# Patient Record
Sex: MALE | Race: WHITE | Employment: OTHER
[De-identification: names, ages, dates, MRNs, and addresses within clinical notes are randomized per-mention and may not be internally consistent; named-entity substitution may affect disease eponyms.]

---

## 2017-01-18 ENCOUNTER — RECORDS - HEALTHEAST (OUTPATIENT)
Dept: ADMINISTRATIVE | Facility: OTHER | Age: 42
End: 2017-01-18

## 2017-06-12 ENCOUNTER — COMMUNICATION - HEALTHEAST (OUTPATIENT)
Dept: SCHEDULING | Facility: CLINIC | Age: 42
End: 2017-06-12

## 2017-08-28 ENCOUNTER — OFFICE VISIT - HEALTHEAST (OUTPATIENT)
Dept: FAMILY MEDICINE | Facility: CLINIC | Age: 42
End: 2017-08-28

## 2017-08-28 DIAGNOSIS — E78.00 HYPERCHOLESTEROLEMIA: ICD-10-CM

## 2017-08-28 DIAGNOSIS — Z00.00 ROUTINE GENERAL MEDICAL EXAMINATION AT A HEALTH CARE FACILITY: ICD-10-CM

## 2017-08-28 DIAGNOSIS — Z12.5 SCREENING FOR PROSTATE CANCER: ICD-10-CM

## 2017-08-28 LAB — PSA SERPL-MCNC: 0.3 NG/ML (ref 0–2.5)

## 2017-08-28 ASSESSMENT — MIFFLIN-ST. JEOR: SCORE: 1767.46

## 2017-09-11 ENCOUNTER — COMMUNICATION - HEALTHEAST (OUTPATIENT)
Dept: FAMILY MEDICINE | Facility: CLINIC | Age: 42
End: 2017-09-11

## 2017-09-25 ENCOUNTER — OFFICE VISIT - HEALTHEAST (OUTPATIENT)
Dept: FAMILY MEDICINE | Facility: CLINIC | Age: 42
End: 2017-09-25

## 2017-09-25 DIAGNOSIS — E78.00 HYPERCHOLESTEREMIA: ICD-10-CM

## 2017-11-02 ENCOUNTER — AMBULATORY - HEALTHEAST (OUTPATIENT)
Dept: NURSING | Facility: CLINIC | Age: 42
End: 2017-11-02

## 2017-11-02 DIAGNOSIS — Z23 INFLUENZA VACCINE NEEDED: ICD-10-CM

## 2018-08-20 ENCOUNTER — COMMUNICATION - HEALTHEAST (OUTPATIENT)
Dept: FAMILY MEDICINE | Facility: CLINIC | Age: 43
End: 2018-08-20

## 2018-08-20 DIAGNOSIS — E78.00 HYPERCHOLESTEREMIA: ICD-10-CM

## 2018-08-20 DIAGNOSIS — Z12.5 SCREENING FOR PROSTATE CANCER: ICD-10-CM

## 2018-08-23 ENCOUNTER — AMBULATORY - HEALTHEAST (OUTPATIENT)
Dept: LAB | Facility: CLINIC | Age: 43
End: 2018-08-23

## 2018-08-23 DIAGNOSIS — Z12.5 SCREENING FOR PROSTATE CANCER: ICD-10-CM

## 2018-08-23 DIAGNOSIS — E78.00 HYPERCHOLESTEREMIA: ICD-10-CM

## 2018-08-23 LAB
ALBUMIN SERPL-MCNC: 4.2 G/DL (ref 3.5–5)
ALP SERPL-CCNC: 63 U/L (ref 45–120)
ALT SERPL W P-5'-P-CCNC: 13 U/L (ref 0–45)
ANION GAP SERPL CALCULATED.3IONS-SCNC: 11 MMOL/L (ref 5–18)
AST SERPL W P-5'-P-CCNC: 17 U/L (ref 0–40)
BILIRUB SERPL-MCNC: 0.7 MG/DL (ref 0–1)
BUN SERPL-MCNC: 14 MG/DL (ref 8–22)
CALCIUM SERPL-MCNC: 9.8 MG/DL (ref 8.5–10.5)
CHLORIDE BLD-SCNC: 105 MMOL/L (ref 98–107)
CHOLEST SERPL-MCNC: 235 MG/DL
CO2 SERPL-SCNC: 25 MMOL/L (ref 22–31)
CREAT SERPL-MCNC: 0.85 MG/DL (ref 0.7–1.3)
CRP SERPL HS-MCNC: 1.2 MG/L (ref 0–3)
FASTING STATUS PATIENT QL REPORTED: YES
GFR SERPL CREATININE-BSD FRML MDRD: >60 ML/MIN/1.73M2
GLUCOSE BLD-MCNC: 92 MG/DL (ref 70–125)
HDLC SERPL-MCNC: 58 MG/DL
LDLC SERPL CALC-MCNC: 160 MG/DL
POTASSIUM BLD-SCNC: 4.4 MMOL/L (ref 3.5–5)
PROT SERPL-MCNC: 7.7 G/DL (ref 6–8)
PSA SERPL-MCNC: 0.3 NG/ML (ref 0–2.5)
SODIUM SERPL-SCNC: 141 MMOL/L (ref 136–145)
TRIGL SERPL-MCNC: 84 MG/DL

## 2018-08-27 ENCOUNTER — OFFICE VISIT - HEALTHEAST (OUTPATIENT)
Dept: FAMILY MEDICINE | Facility: CLINIC | Age: 43
End: 2018-08-27

## 2018-08-27 DIAGNOSIS — E78.00 HYPERCHOLESTEREMIA: ICD-10-CM

## 2018-08-27 DIAGNOSIS — Z00.00 ROUTINE GENERAL MEDICAL EXAMINATION AT A HEALTH CARE FACILITY: ICD-10-CM

## 2018-08-27 ASSESSMENT — MIFFLIN-ST. JEOR: SCORE: 1727.21

## 2018-10-26 ENCOUNTER — AMBULATORY - HEALTHEAST (OUTPATIENT)
Dept: NURSING | Facility: CLINIC | Age: 43
End: 2018-10-26

## 2018-10-26 DIAGNOSIS — Z23 FLU VACCINE NEED: ICD-10-CM

## 2018-12-06 ENCOUNTER — COMMUNICATION - HEALTHEAST (OUTPATIENT)
Dept: FAMILY MEDICINE | Facility: CLINIC | Age: 43
End: 2018-12-06

## 2019-04-24 ENCOUNTER — AMBULATORY - HEALTHEAST (OUTPATIENT)
Dept: LAB | Facility: CLINIC | Age: 44
End: 2019-04-24

## 2019-04-24 DIAGNOSIS — E78.00 HYPERCHOLESTEREMIA: ICD-10-CM

## 2019-04-24 LAB
CHOLEST SERPL-MCNC: 261 MG/DL
FASTING STATUS PATIENT QL REPORTED: YES
HDLC SERPL-MCNC: 62 MG/DL
LDLC SERPL CALC-MCNC: 179 MG/DL
TRIGL SERPL-MCNC: 101 MG/DL

## 2019-04-25 ENCOUNTER — COMMUNICATION - HEALTHEAST (OUTPATIENT)
Dept: FAMILY MEDICINE | Facility: CLINIC | Age: 44
End: 2019-04-25

## 2019-04-29 ENCOUNTER — OFFICE VISIT - HEALTHEAST (OUTPATIENT)
Dept: FAMILY MEDICINE | Facility: CLINIC | Age: 44
End: 2019-04-29

## 2019-04-29 DIAGNOSIS — R06.83 SNORING: ICD-10-CM

## 2019-04-29 DIAGNOSIS — E78.00 HYPERCHOLESTEREMIA: ICD-10-CM

## 2019-04-29 DIAGNOSIS — Z13.228 ENCOUNTER FOR SCREENING FOR OTHER METABOLIC DISORDERS: ICD-10-CM

## 2019-04-29 ASSESSMENT — MIFFLIN-ST. JEOR: SCORE: 1734.02

## 2019-05-06 ENCOUNTER — COMMUNICATION - HEALTHEAST (OUTPATIENT)
Dept: FAMILY MEDICINE | Facility: CLINIC | Age: 44
End: 2019-05-06

## 2019-05-06 ENCOUNTER — HOSPITAL ENCOUNTER (OUTPATIENT)
Dept: CT IMAGING | Facility: CLINIC | Age: 44
Discharge: HOME | End: 2019-05-06
Attending: FAMILY MEDICINE

## 2019-05-06 DIAGNOSIS — E78.00 HYPERCHOLESTEREMIA: ICD-10-CM

## 2019-05-06 LAB
CV CALCIUM SCORE AGATSTON LM: 0
CV CALCIUM SCORING AGATSON LAD: 0
CV CALCIUM SCORING AGATSTON CX: 0
CV CALCIUM SCORING AGATSTON RCA: 0
CV CALCIUM SCORING AGATSTON TOTAL: 0

## 2019-06-13 ENCOUNTER — COMMUNICATION - HEALTHEAST (OUTPATIENT)
Dept: FAMILY MEDICINE | Facility: CLINIC | Age: 44
End: 2019-06-13

## 2019-07-10 ENCOUNTER — RECORDS - HEALTHEAST (OUTPATIENT)
Dept: ADMINISTRATIVE | Facility: OTHER | Age: 44
End: 2019-07-10

## 2019-08-27 ENCOUNTER — AMBULATORY - HEALTHEAST (OUTPATIENT)
Dept: LAB | Facility: CLINIC | Age: 44
End: 2019-08-27

## 2019-08-27 DIAGNOSIS — Z13.228 ENCOUNTER FOR SCREENING FOR OTHER METABOLIC DISORDERS: ICD-10-CM

## 2019-08-27 DIAGNOSIS — E78.00 HYPERCHOLESTEREMIA: ICD-10-CM

## 2019-08-27 LAB
CHOLEST SERPL-MCNC: 245 MG/DL
FASTING STATUS PATIENT QL REPORTED: YES
FASTING STATUS PATIENT QL REPORTED: YES
GLUCOSE BLD-MCNC: 80 MG/DL (ref 70–99)
HDLC SERPL-MCNC: 55 MG/DL
LDLC SERPL CALC-MCNC: 165 MG/DL
TRIGL SERPL-MCNC: 125 MG/DL

## 2019-09-02 ENCOUNTER — COMMUNICATION - HEALTHEAST (OUTPATIENT)
Dept: FAMILY MEDICINE | Facility: CLINIC | Age: 44
End: 2019-09-02

## 2019-10-07 ENCOUNTER — OFFICE VISIT - HEALTHEAST (OUTPATIENT)
Dept: SLEEP MEDICINE | Facility: CLINIC | Age: 44
End: 2019-10-07

## 2019-10-07 DIAGNOSIS — R29.818 SUSPECTED SLEEP APNEA: ICD-10-CM

## 2019-10-07 DIAGNOSIS — G47.10 HYPERSOMNIA: ICD-10-CM

## 2019-10-07 DIAGNOSIS — G47.8 NON-RESTORATIVE SLEEP: ICD-10-CM

## 2019-10-07 DIAGNOSIS — R06.83 SNORING: ICD-10-CM

## 2019-10-07 ASSESSMENT — MIFFLIN-ST. JEOR: SCORE: 1745.36

## 2019-10-21 ENCOUNTER — OFFICE VISIT - HEALTHEAST (OUTPATIENT)
Dept: INTERNAL MEDICINE | Facility: CLINIC | Age: 44
End: 2019-10-21

## 2019-10-21 DIAGNOSIS — J06.9 VIRAL URI WITH COUGH: ICD-10-CM

## 2019-10-21 ASSESSMENT — MIFFLIN-ST. JEOR: SCORE: 1740.82

## 2019-10-31 ENCOUNTER — OFFICE VISIT - HEALTHEAST (OUTPATIENT)
Dept: INTERNAL MEDICINE | Facility: CLINIC | Age: 44
End: 2019-10-31

## 2019-10-31 DIAGNOSIS — R05.9 COUGH: ICD-10-CM

## 2019-10-31 ASSESSMENT — MIFFLIN-ST. JEOR: SCORE: 1731.75

## 2019-11-11 ENCOUNTER — COMMUNICATION - HEALTHEAST (OUTPATIENT)
Dept: SLEEP MEDICINE | Facility: CLINIC | Age: 44
End: 2019-11-11

## 2019-11-11 DIAGNOSIS — R06.83 SNORING: ICD-10-CM

## 2019-11-11 DIAGNOSIS — G47.10 HYPERSOMNIA: ICD-10-CM

## 2019-11-11 DIAGNOSIS — G47.8 NON-RESTORATIVE SLEEP: ICD-10-CM

## 2019-11-11 DIAGNOSIS — R29.818 SUSPECTED SLEEP APNEA: ICD-10-CM

## 2019-11-13 ENCOUNTER — RECORDS - HEALTHEAST (OUTPATIENT)
Dept: SLEEP MEDICINE | Facility: CLINIC | Age: 44
End: 2019-11-13

## 2019-11-13 DIAGNOSIS — R29.818 OTHER SYMPTOMS AND SIGNS INVOLVING THE NERVOUS SYSTEM: ICD-10-CM

## 2019-11-13 DIAGNOSIS — R06.83 SNORING: ICD-10-CM

## 2019-11-13 DIAGNOSIS — G47.8 OTHER SLEEP DISORDERS: ICD-10-CM

## 2019-11-13 DIAGNOSIS — G47.10 HYPERSOMNIA, UNSPECIFIED: ICD-10-CM

## 2019-11-18 ENCOUNTER — COMMUNICATION - HEALTHEAST (OUTPATIENT)
Dept: SLEEP MEDICINE | Facility: CLINIC | Age: 44
End: 2019-11-18

## 2019-12-04 ENCOUNTER — TRANSFERRED RECORDS (OUTPATIENT)
Dept: HEALTH INFORMATION MANAGEMENT | Facility: CLINIC | Age: 44
End: 2019-12-04

## 2019-12-04 ENCOUNTER — OFFICE VISIT - HEALTHEAST (OUTPATIENT)
Dept: SLEEP MEDICINE | Facility: CLINIC | Age: 44
End: 2019-12-04

## 2019-12-04 DIAGNOSIS — G47.8 NON-RESTORATIVE SLEEP: ICD-10-CM

## 2019-12-04 DIAGNOSIS — R06.83 SNORING: ICD-10-CM

## 2019-12-04 DIAGNOSIS — R29.818 SUSPECTED SLEEP APNEA: ICD-10-CM

## 2019-12-04 DIAGNOSIS — G47.10 HYPERSOMNIA: ICD-10-CM

## 2019-12-04 ASSESSMENT — MIFFLIN-ST. JEOR: SCORE: 1731.75

## 2020-01-09 DIAGNOSIS — G47.10 HYPERSOMNIA: ICD-10-CM

## 2020-01-09 DIAGNOSIS — R29.818 SUSPECTED SLEEP APNEA: Primary | ICD-10-CM

## 2020-01-09 DIAGNOSIS — R06.83 SNORING: ICD-10-CM

## 2020-01-09 DIAGNOSIS — G47.8 NON-RESTORATIVE SLEEP: ICD-10-CM

## 2020-02-12 ENCOUNTER — THERAPY VISIT (OUTPATIENT)
Dept: SLEEP MEDICINE | Facility: CLINIC | Age: 45
End: 2020-02-12
Payer: COMMERCIAL

## 2020-02-12 DIAGNOSIS — G47.10 HYPERSOMNIA: ICD-10-CM

## 2020-02-12 DIAGNOSIS — R06.83 SNORING: ICD-10-CM

## 2020-02-12 DIAGNOSIS — R29.818 SUSPECTED SLEEP APNEA: ICD-10-CM

## 2020-02-12 DIAGNOSIS — G47.8 NON-RESTORATIVE SLEEP: ICD-10-CM

## 2020-02-12 PROCEDURE — 95810 POLYSOM 6/> YRS 4/> PARAM: CPT | Performed by: INTERNAL MEDICINE

## 2020-02-19 ENCOUNTER — AMBULATORY - HEALTHEAST (OUTPATIENT)
Dept: SLEEP MEDICINE | Facility: CLINIC | Age: 45
End: 2020-02-19

## 2020-02-19 LAB — SLPCOMP: NORMAL

## 2020-03-01 ENCOUNTER — HEALTH MAINTENANCE LETTER (OUTPATIENT)
Age: 45
End: 2020-03-01

## 2020-03-04 ENCOUNTER — OFFICE VISIT - HEALTHEAST (OUTPATIENT)
Dept: SLEEP MEDICINE | Facility: CLINIC | Age: 45
End: 2020-03-04

## 2020-03-04 DIAGNOSIS — G47.69 SLEEP-RELATED MOVEMENT DISORDER: ICD-10-CM

## 2020-03-04 DIAGNOSIS — G47.8 UPPER AIRWAY RESISTANCE SYNDROME: ICD-10-CM

## 2020-03-04 DIAGNOSIS — G47.10 HYPERSOMNIA: ICD-10-CM

## 2020-03-23 ENCOUNTER — COMMUNICATION - HEALTHEAST (OUTPATIENT)
Dept: FAMILY MEDICINE | Facility: CLINIC | Age: 45
End: 2020-03-23

## 2020-08-03 ENCOUNTER — RECORDS - HEALTHEAST (OUTPATIENT)
Dept: ADMINISTRATIVE | Facility: OTHER | Age: 45
End: 2020-08-03

## 2020-08-13 ENCOUNTER — COMMUNICATION - HEALTHEAST (OUTPATIENT)
Dept: FAMILY MEDICINE | Facility: CLINIC | Age: 45
End: 2020-08-13

## 2020-08-13 DIAGNOSIS — E78.00 HYPERCHOLESTEREMIA: ICD-10-CM

## 2020-08-24 ENCOUNTER — AMBULATORY - HEALTHEAST (OUTPATIENT)
Dept: LAB | Facility: CLINIC | Age: 45
End: 2020-08-24

## 2020-08-24 DIAGNOSIS — E78.00 HYPERCHOLESTEREMIA: ICD-10-CM

## 2020-08-24 LAB
ALBUMIN SERPL-MCNC: 4.2 G/DL (ref 3.5–5)
ALP SERPL-CCNC: 61 U/L (ref 45–120)
ALT SERPL W P-5'-P-CCNC: 19 U/L (ref 0–45)
ANION GAP SERPL CALCULATED.3IONS-SCNC: 9 MMOL/L (ref 5–18)
AST SERPL W P-5'-P-CCNC: 18 U/L (ref 0–40)
BILIRUB SERPL-MCNC: 0.5 MG/DL (ref 0–1)
BUN SERPL-MCNC: 13 MG/DL (ref 8–22)
CALCIUM SERPL-MCNC: 9.9 MG/DL (ref 8.5–10.5)
CHLORIDE BLD-SCNC: 102 MMOL/L (ref 98–107)
CHOLEST SERPL-MCNC: 252 MG/DL
CO2 SERPL-SCNC: 27 MMOL/L (ref 22–31)
CREAT SERPL-MCNC: 0.99 MG/DL (ref 0.7–1.3)
FASTING STATUS PATIENT QL REPORTED: YES
GFR SERPL CREATININE-BSD FRML MDRD: >60 ML/MIN/1.73M2
GLUCOSE BLD-MCNC: 85 MG/DL (ref 70–125)
HDLC SERPL-MCNC: 51 MG/DL
LDLC SERPL CALC-MCNC: 174 MG/DL
POTASSIUM BLD-SCNC: 4.4 MMOL/L (ref 3.5–5)
PROT SERPL-MCNC: 7.9 G/DL (ref 6–8)
SODIUM SERPL-SCNC: 138 MMOL/L (ref 136–145)
TRIGL SERPL-MCNC: 135 MG/DL

## 2020-08-25 ENCOUNTER — COMMUNICATION - HEALTHEAST (OUTPATIENT)
Dept: FAMILY MEDICINE | Facility: CLINIC | Age: 45
End: 2020-08-25

## 2020-08-28 ENCOUNTER — OFFICE VISIT - HEALTHEAST (OUTPATIENT)
Dept: FAMILY MEDICINE | Facility: CLINIC | Age: 45
End: 2020-08-28

## 2020-08-28 DIAGNOSIS — Z00.00 ROUTINE GENERAL MEDICAL EXAMINATION AT A HEALTH CARE FACILITY: ICD-10-CM

## 2020-08-28 DIAGNOSIS — E78.00 HYPERCHOLESTEREMIA: ICD-10-CM

## 2020-08-28 DIAGNOSIS — Z12.5 SCREENING FOR PROSTATE CANCER: ICD-10-CM

## 2020-08-28 LAB — PSA SERPL-MCNC: 0.2 NG/ML (ref 0–2.5)

## 2020-08-28 ASSESSMENT — MIFFLIN-ST. JEOR: SCORE: 1743.23

## 2020-09-29 ENCOUNTER — AMBULATORY - HEALTHEAST (OUTPATIENT)
Dept: NURSING | Facility: CLINIC | Age: 45
End: 2020-09-29

## 2020-12-09 ENCOUNTER — COMMUNICATION - HEALTHEAST (OUTPATIENT)
Dept: FAMILY MEDICINE | Facility: CLINIC | Age: 45
End: 2020-12-09

## 2020-12-09 DIAGNOSIS — E78.00 HYPERCHOLESTEREMIA: ICD-10-CM

## 2020-12-14 ENCOUNTER — HEALTH MAINTENANCE LETTER (OUTPATIENT)
Age: 45
End: 2020-12-14

## 2020-12-15 ENCOUNTER — AMBULATORY - HEALTHEAST (OUTPATIENT)
Dept: LAB | Facility: CLINIC | Age: 45
End: 2020-12-15

## 2020-12-15 DIAGNOSIS — E78.00 HYPERCHOLESTEREMIA: ICD-10-CM

## 2020-12-15 LAB
CHOLEST SERPL-MCNC: 241 MG/DL
FASTING STATUS PATIENT QL REPORTED: YES
HDLC SERPL-MCNC: 60 MG/DL
LDLC SERPL CALC-MCNC: 160 MG/DL
TRIGL SERPL-MCNC: 103 MG/DL

## 2021-03-27 ENCOUNTER — AMBULATORY - HEALTHEAST (OUTPATIENT)
Dept: NURSING | Facility: CLINIC | Age: 46
End: 2021-03-27

## 2021-04-09 ENCOUNTER — COMMUNICATION - HEALTHEAST (OUTPATIENT)
Dept: FAMILY MEDICINE | Facility: CLINIC | Age: 46
End: 2021-04-09

## 2021-04-12 ENCOUNTER — OFFICE VISIT - HEALTHEAST (OUTPATIENT)
Dept: FAMILY MEDICINE | Facility: CLINIC | Age: 46
End: 2021-04-12

## 2021-04-12 DIAGNOSIS — M25.461 PAIN AND SWELLING OF RIGHT KNEE: ICD-10-CM

## 2021-04-12 DIAGNOSIS — M25.561 PAIN AND SWELLING OF RIGHT KNEE: ICD-10-CM

## 2021-04-12 DIAGNOSIS — M79.10 MYALGIA: ICD-10-CM

## 2021-04-12 LAB
ALBUMIN SERPL-MCNC: 4.2 G/DL (ref 3.5–5)
ALP SERPL-CCNC: 83 U/L (ref 45–120)
ALT SERPL W P-5'-P-CCNC: 12 U/L (ref 0–45)
ANION GAP SERPL CALCULATED.3IONS-SCNC: 14 MMOL/L (ref 5–18)
AST SERPL W P-5'-P-CCNC: 17 U/L (ref 0–40)
BILIRUB SERPL-MCNC: 0.5 MG/DL (ref 0–1)
BUN SERPL-MCNC: 16 MG/DL (ref 8–22)
CALCIUM SERPL-MCNC: 9.7 MG/DL (ref 8.5–10.5)
CHLORIDE BLD-SCNC: 102 MMOL/L (ref 98–107)
CO2 SERPL-SCNC: 25 MMOL/L (ref 22–31)
CREAT SERPL-MCNC: 0.97 MG/DL (ref 0.7–1.3)
CRP SERPL HS-MCNC: 9 MG/L (ref 0–3)
ERYTHROCYTE [DISTWIDTH] IN BLOOD BY AUTOMATED COUNT: 12.4 % (ref 11–14.5)
ERYTHROCYTE [SEDIMENTATION RATE] IN BLOOD BY WESTERGREN METHOD: 21 MM/HR (ref 0–15)
GFR SERPL CREATININE-BSD FRML MDRD: >60 ML/MIN/1.73M2
GLUCOSE BLD-MCNC: 83 MG/DL (ref 70–125)
HCT VFR BLD AUTO: 44.3 % (ref 40–54)
HGB BLD-MCNC: 14.8 G/DL (ref 14–18)
MCH RBC QN AUTO: 28.8 PG (ref 27–34)
MCHC RBC AUTO-ENTMCNC: 33.4 G/DL (ref 32–36)
MCV RBC AUTO: 86 FL (ref 80–100)
PLATELET # BLD AUTO: 293 THOU/UL (ref 140–440)
PMV BLD AUTO: 8.8 FL (ref 7–10)
POTASSIUM BLD-SCNC: 4.5 MMOL/L (ref 3.5–5)
PROT SERPL-MCNC: 8.1 G/DL (ref 6–8)
RBC # BLD AUTO: 5.13 MILL/UL (ref 4.4–6.2)
RHEUMATOID FACT SERPL-ACNC: <15 IU/ML (ref 0–30)
SODIUM SERPL-SCNC: 141 MMOL/L (ref 136–145)
WBC: 8.6 THOU/UL (ref 4–11)

## 2021-04-12 ASSESSMENT — MIFFLIN-ST. JEOR: SCORE: 1707.28

## 2021-04-13 ENCOUNTER — COMMUNICATION - HEALTHEAST (OUTPATIENT)
Dept: FAMILY MEDICINE | Facility: CLINIC | Age: 46
End: 2021-04-13

## 2021-04-13 LAB — B BURGDOR IGG+IGM SER QL: 0.09 INDEX VALUE

## 2021-04-17 ENCOUNTER — AMBULATORY - HEALTHEAST (OUTPATIENT)
Dept: NURSING | Facility: CLINIC | Age: 46
End: 2021-04-17

## 2021-04-18 ENCOUNTER — HEALTH MAINTENANCE LETTER (OUTPATIENT)
Age: 46
End: 2021-04-18

## 2021-04-18 LAB
MAGNESIUM,RBC - HISTORICAL: 4.9 MG/DL (ref 3.5–7.1)
SPECIMEN STATUS: NORMAL

## 2021-04-22 ENCOUNTER — OFFICE VISIT - HEALTHEAST (OUTPATIENT)
Dept: FAMILY MEDICINE | Facility: CLINIC | Age: 46
End: 2021-04-22

## 2021-04-22 ENCOUNTER — COMMUNICATION - HEALTHEAST (OUTPATIENT)
Dept: FAMILY MEDICINE | Facility: CLINIC | Age: 46
End: 2021-04-22

## 2021-04-22 DIAGNOSIS — M25.561 PAIN AND SWELLING OF RIGHT KNEE: ICD-10-CM

## 2021-04-22 DIAGNOSIS — R79.82 ELEVATED C-REACTIVE PROTEIN (CRP): ICD-10-CM

## 2021-04-22 DIAGNOSIS — M25.461 PAIN AND SWELLING OF RIGHT KNEE: ICD-10-CM

## 2021-05-28 NOTE — PATIENT INSTRUCTIONS - HE
Consider [employer] - Dr. Renato Weinstein BPF - 2 capsules daily by [employer].  [facility]    Will patient schedule appointment? Yes  HE Radiology [facility]   Phone: [phone]    [facility]  PHONE: [phone]

## 2021-05-28 NOTE — PROGRESS NOTES
"SUBJECTIVE: Andre Garay is a 43 y.o. male with:  Chief Complaint   Patient presents with     Follow-up     lab result  lipid     Sleep Apnea    1) Elevated cholesterol - He is concerned about elevated LDL on recent lipid profile.  He eats fairly healthy but enjoys sweets.  Drinks wine most nights.  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 He has 2 siblings with elevated cholesterol.  He is worried about heart disease.  No FH. No chest pain.    2) LIZETH - He had a rolfing treatment and fell asleep during the treatment.  His provider felt he had sleep apnea based on his snoring.  His wife has also noticed his snoring.  He wakes up but does not feel refreshed.    SH: . Works as consultant.    OBJECTIVE: /84 (Patient Site: Left Arm, Patient Position: Sitting, Cuff Size: Adult Regular)   Pulse 72   Temp 97.5  F (36.4  C) (Oral)   Resp 16   Ht 5' 11\" (1.803 m)   Wt 184 lb 8 oz (83.7 kg)   BMI 25.73 kg/m   no distress    10 year ASCVD risk is 5.8%    Lab Results   Component Value Date    CHOL 261 (H) 04/24/2019    CHOL 235 (H) 08/23/2018    CHOL 256 (H) 08/25/2016     Lab Results   Component Value Date    HDL 62 04/24/2019    HDL 58 08/23/2018    HDL 50 08/25/2016     Lab Results   Component Value Date    LDLCALC 179 (H) 04/24/2019    LDLCALC 160 (H) 08/23/2018    LDLCALC 182 (H) 08/25/2016     Lab Results   Component Value Date    TRIG 101 04/24/2019    TRIG 84 08/23/2018    TRIG 120 08/25/2016     No components found for: CHOLHDL    Andre was seen today for follow-up and sleep apnea.    Diagnoses and all orders for this visit:    Hypercholesteremia- We discussed treatment options. He is going to cut back on sugar/ animal fats/ wine.  Recheck LDL in 3 months. He would like to proceed with a CT calcium score to further evaluate his risk of ASCVD.  -     CT Cardiac Calcium Score; Future  -     Lipid Cascade; Future    Patient Instructions   Consider " [employer] - Dr. Renato Weinstein BPF - 2 capsules daily by [employer].  [facility]    Will patient schedule appointment? Yes  HE Radiology [facility] [place]   Phone: [phone]    [facility]  PHONE: [phone]         Encounter for screening for other metabolic disorders  -     Glucose; Future    Snoring  -     Ambulatory referral to Sleep Medicine       Yanique Monge

## 2021-05-28 NOTE — TELEPHONE ENCOUNTER
Left message to call back for: Pt  Information to relay to patient:    PCP is okay to use reserve slots for next week. Please assist pt in making an appt.  Will also send mychart message.

## 2021-05-31 VITALS — BODY MASS INDEX: 26.74 KG/M2 | WEIGHT: 191 LBS | HEIGHT: 71 IN

## 2021-05-31 VITALS — BODY MASS INDEX: 26.31 KG/M2 | WEIGHT: 190 LBS

## 2021-06-02 VITALS — HEIGHT: 71 IN | BODY MASS INDEX: 25.62 KG/M2 | WEIGHT: 183 LBS

## 2021-06-02 NOTE — PROGRESS NOTES
"  Office Visit   Andre Garay   44 y.o. male    Date of Visit: 10/31/2019    Chief Complaint   Patient presents with     Cough        Assessment and Plan   1. Cough  And I do not see any evidence of pneumonia.  His oxygen level is good and his lung exam is clear.  He is actually improved from a week ago.  I recommend that he continue with conservative measures.  He is going to be traveling next week so I did tell him that if his symptoms seem to worsen over the weekend he could let me know and I would start him on a Z-Yonny.  He is in agreement with this plan.         No follow-ups on file.     History of Present Illness   This 44 y.o. old male comes in due to ongoing cough symptoms.  He has had a cough now for about 2 weeks but actually is improving.  He feels that he is still little bit tired but has not had fevers or shortness of breath.  His son was recently diagnosed with walking pneumonia and it was recommended that he get rechecked.  His cough is productive but mostly of clear phlegm.  Initially there was some green and yellow phlegm.  Again is not short of breath.  He has no history of lung disease.  Just feeling a little bit more tired.  He has no other acute concerns today.    Review of Systems: As above, systems otherwise reviewed and negative.     Medications, Allergies and Problem List   Patient Active Problem List   Diagnosis     Hypercholesteremia     Screening for prostate cancer     Current Outpatient Medications   Medication Sig Dispense Refill     ibuprofen (ADVIL) 200 MG tablet Take 200 mg by mouth every 6 (six) hours as needed for pain or headaches.       No current facility-administered medications for this visit.      No Known Allergies       Physical Exam     /82 (Patient Site: Right Arm, Patient Position: Sitting, Cuff Size: Adult Regular)   Pulse (!) 49   Temp 97.8  F (36.6  C) (Oral)   Ht 5' 11\" (1.803 m)   Wt 184 lb (83.5 kg)   SpO2 97%   BMI 25.66 kg/m      General:  " Patient is alert and in no apparent distress.  Cardiovascular:  Regular rate and rhythm, normal S1/S2, no murmurs, rubs, or gallop.  Pulmonary:  Lungs are clear to auscultation bilaterally with normal respiratory effort.  No wheezes or rales are detected.           Additional Information   Social History     Tobacco Use     Smoking status: Never Smoker     Smokeless tobacco: Never Used   Substance Use Topics     Alcohol use: Yes     Alcohol/week: 6.0 - 7.0 standard drinks     Types: 6 - 7 Glasses of wine per week     Drug use: No          Dee Dee Olivares MD

## 2021-06-02 NOTE — PROGRESS NOTES
Dear Dr. Monge, Yanique Trevino Md  [address] [place] [address]    Thank you for the opportunity to participate in the care of Mr. Andre Garay.    He is a 44 y.o. male who comes to the clinic with a chief complaint of excessive daytime sleepiness is been going on for more than 5 years.  The patient has been informed by medical professional that he has pauses in his breathing during sleep followed by snoring.  Patient also complains of nonrestorative sleep despite adequate hours.  The patient also has had episodes of drowsy driving.  Strongly advised patient against drowsy driving at all cost.  The patient's review of system is otherwise negative.     Ideal Sleep-Wake Cycle(devoid of societal pressure):    Patient would try to initiate sleep at around 11 PM with a sleep latency of 30 minutes. The patient would have 0 awakening. Final wake up time is around 7-8 AM.    Past Medical History  Past Medical History:   Diagnosis Date     Hypertension     RESOLVED        Past Surgical History  Past Surgical History:   Procedure Laterality Date     [place]       KNEE SURGERY  2002     SHOULDER SURGERY          Meds  Current Outpatient Medications   Medication Sig Dispense Refill     ibuprofen (ADVIL) 200 MG tablet Take 200 mg by mouth every 6 (six) hours as needed for pain or headaches.       No current facility-administered medications for this visit.         Allergies  Patient has no known allergies.     Social History  Social History     Socioeconomic History     Marital status:      Spouse name: Not on file     Number of children: Not on file     Years of education: Not on file     Highest education level: Not on file   Occupational History     Not on file   Social Needs     Financial resource strain: Not on file     Food insecurity:     Worry: Not on file     Inability: Not on file     Transportation needs:     Medical: Not on file     Non-medical: Not on file   Tobacco Use     Smoking status:  Never Smoker     Smokeless tobacco: Never Used   Substance and Sexual Activity     Alcohol use: Yes     Alcohol/week: 6.0 - 7.0 standard drinks     Types: 6 - 7 Glasses of wine per week     Drug use: No     Sexual activity: Not on file   Lifestyle     Physical activity:     Days per week: Not on file     Minutes per session: Not on file     Stress: Not on file   Relationships     Social connections:     Talks on phone: Not on file     Gets together: Not on file     Attends Judaism service: Not on file     Active member of club or organization: Not on file     Attends meetings of clubs or organizations: Not on file     Relationship status: Not on file     Intimate partner violence:     Fear of current or ex partner: Not on file     Emotionally abused: Not on file     Physically abused: Not on file     Forced sexual activity: Not on file   Other Topics Concern     Not on file   Social History Narrative     Not on file        Family History  Family History   Problem Relation Age of Onset     Hyperlipidemia Father      Other Father         enlarged prostate, NO cancer     Hyperlipidemia Brother      Alzheimer's disease Mother      Other Sister         Knee problems     Heart disease Neg Hx         Review of Systems:  Constitutional: Negative except as noted in HPI.   Eyes: Negative except as noted in HPI.   ENT: Negative except as noted in HPI.   Cardiovascular: Negative except as noted in HPI.   Respiratory: Negative except as noted in HPI.   Gastrointestinal: Negative except as noted in HPI.   Genitourinary: Negative except as noted in HPI.   Musculoskeletal: Negative except as noted in HPI.   Integumentary: Negative except as noted in HPI.   Neurological: Negative except as noted in HPI.   Psychiatric: Negative except as noted in HPI.   Endocrine: Negative except as noted in HPI.   Hematologic/Lymphatic: Negative except as noted in HPI.      STOP BANG 10/7/2019   Do you snore loudly (louder than talking or loud  "enough to be heard through closed doors)? 1   Do you often feel tired, fatigued, or sleepy during daytime? 1   Has anyone observed you stop breathing in your sleep? 1   Do you have or are you being treated for high blood pressure? 0   BMI more than 35 kg/m2 0   Age over 50 years old? 0   Neck circumference greater than 16 inches? 0   Gender male? 1   Total Score 4     Epworths Sleepiness Scale 10/7/2019   Sitting and reading 3   Watching TV 3   Sitting, inactive in a public place (e.g. a theatre or a meeting) 2   As a passenger in a car for an hour without a break 3   Lying down to rest in the afternoon when circumstances permit 3   Sitting and talking to someone 1   Sitting quietly after a lunch without alcohol 1   In a car, while stopped for a few minutes in traffic 1   Total score 17     Rooming 10/7/2019   Usual bedtime 10pm   Sleep Latency 10 minutes   Awakenings 0   Wake Up Time 6:30   Weekends varies   Energy Drinks no   Coffee yes   Cola no   Difficulty falling asleep No   Difficulty staying asleep No   Excessive daytime tiredness Yes   Excessive daytime sleepiness Yes   Dozing off while driving Yes   Shift Worker No   Sleep Walking? No   Restless legs symptoms Yes       Physical Exam:  /87   Pulse (!) 59   Ht 5' 11\" (1.803 m)   Wt 187 lb (84.8 kg)   SpO2 97%   BMI 26.08 kg/m    BMI:Body mass index is 26.08 kg/m .   GEN: NAD, appropriate for age  Head: Normocephalic.  EYES: PERRLA, EOMI  ENT: Oropharynx is clear, Chen class 4+ airway.   Nasal mucosa is moist without erythema  Neck : Thyroid is within normal limits. Neck Circ 15.75\"  CV: Regular rate and rhythm, S1 & S2 positive.  LUNGS: Bilateral breathsounds heard.   ABDOMEN: Positive bowel sounds in all quadrants, soft, no rebound or guarding  MUSCULOSKELETAL: No leg swelling  SKIN: warm, dry, no rashes  Neurological: Alert, oriented to time, place, and person.  Psych: normal mood, normal affect     Labs/Studies:     Lab Results   Component " Value Date    WBC 7.5 08/06/2015    HGB 14.5 08/06/2015    HCT 41.7 08/06/2015    MCV 88 08/06/2015     08/06/2015         Chemistry        Component Value Date/Time     08/23/2018 0919    K 4.4 08/23/2018 0919     08/23/2018 0919    CO2 25 08/23/2018 0919    BUN 14 08/23/2018 0919    CREATININE 0.85 08/23/2018 0919    GLU 80 08/27/2019 0814        Component Value Date/Time    CALCIUM 9.8 08/23/2018 0919    ALKPHOS 63 08/23/2018 0919    AST 17 08/23/2018 0919    ALT 13 08/23/2018 0919    BILITOT 0.7 08/23/2018 0919            No results found for: FERRITIN  No results found for: TSH      Assessment and Plan:  In summary Andre aGray is a 44 y.o. year old male here for sleep disturbance.  1.  Suspected sleep apnea   Mr. Andre Garay has high risk for obstructive sleep apnea based on the history of witnessed apnea, snoring and a crowded airway. I educated the patient on the underlying pathophysiology of obstructive sleep apnea. We reviewed the risks associated with sleep apnea, including increased cardiovascular risk and overall death. We talked about treatments briefly. I recommend getting a Home sleep study or an split-night nocturnal polysomnography.  The patient would prefer the latter.  The patient should return to the clinic to discuss results and treatment option in a patient-centered approach.  The patient was also advised to call us to schedule for a follow-up visit to review the results of the study after it has been completed.  2.  Hypersomnia  3.  Nonrestorative sleep  4.  Snoring    Patient verbalized understanding of these issues, agrees with the plan and all questions were answered today. Patient was given an opportuntity to voice any other symptoms or concerns not listed above. Patient did not have any other symptoms or concerns.      Patient told to return in one week after the sleep study is interpreted.      Gurjit Owens DO  Board Certified in Internal Medicine and  Sleep Medicine  [facility].    (Note created with Dragon voice recognition and unintended spelling errors and word substitutions may occur)

## 2021-06-02 NOTE — PROGRESS NOTES
"  Office Visit - Follow Up   Andre Garay   44 y.o. male    Date of Visit: 10/21/2019    Chief Complaint   Patient presents with     URI     x 3 days Sxs: dry cough, running nose. No sore throat, no fever, etc - took some tylenol sinus        Assessment and Plan   1. Viral URI with cough  Symptoms and findings consistent with viral URI.  We discussed management.  Over-the-counter cold and cough remedies recommended.  Call if things are improving or worsening.  I did offer has some codeine cough syrup but he states he is sleeping well and does not wish to take any of that.  If things are worsening he is to let me know.  Call if any issues.        Return if symptoms worsen or fail to improve.     History of Present Illness   This 44 y.o. old patient of the [facility] comes in today for evaluation of cough and cold is had for 3 to 4 days now.  His kids had it and his wife have it as well.  He is going on a trip in 2 days for business and wants to make sure that he is okay to go.  No fevers chills or night sweats.  He does have some cough and yellowish sputum production.  Nasal congestion rhinorrhea etc.  He is feeling better than he was when he called to make the appointment earlier today so he thinks that he is on the mend.  He is sleeping well at night.    Review of Systems: A comprehensive review of systems was negative except as noted.     Medications, Allergies and Problem List   Reviewed, reconciled and updated  Post Discharge Medication Reconciliation Status:      Physical Exam   General Appearance:   Pleasant gentleman in no distress.  Sounds nasally congested.  No conjunctival injection.  Ears are normal.  Oropharynx without erythema or exudate.  No purulent nasal discharge seen.  Lungs are clear bilaterally.    /80 (Patient Site: Right Arm, Patient Position: Sitting, Cuff Size: Adult Regular)   Pulse 74   Temp 98.2  F (36.8  C)   Ht 5' 11\" (1.803 m)   Wt 186 lb (84.4 kg)   SpO2 97%  "  BMI 25.94 kg/m           Additional Information   Current Outpatient Medications   Medication Sig Dispense Refill     ibuprofen (ADVIL) 200 MG tablet Take 200 mg by mouth every 6 (six) hours as needed for pain or headaches.       No current facility-administered medications for this visit.      No Known Allergies  Social History     Tobacco Use     Smoking status: Never Smoker     Smokeless tobacco: Never Used   Substance Use Topics     Alcohol use: Yes     Alcohol/week: 6.0 - 7.0 standard drinks     Types: 6 - 7 Glasses of wine per week     Drug use: No       Review and/or order of clinical lab tests:  Review and/or order of radiology tests:  Review and/or order of medicine tests:  Discussion of test results with performing physician:  Decision to obtain old records and/or obtain history from someone other than the patient:  Review and summarization of old records and/or obtaining history from someone other than the patient and.or discussion of case with another health care provider:  Independent visualization of image, tracing or specimen itself:    Time: not applicable     Richard Montanez MD

## 2021-06-03 VITALS — BODY MASS INDEX: 25.83 KG/M2 | HEIGHT: 71 IN | WEIGHT: 184.5 LBS

## 2021-06-03 VITALS
HEART RATE: 49 BPM | DIASTOLIC BLOOD PRESSURE: 82 MMHG | SYSTOLIC BLOOD PRESSURE: 122 MMHG | OXYGEN SATURATION: 97 % | BODY MASS INDEX: 25.76 KG/M2 | WEIGHT: 184 LBS | HEIGHT: 71 IN | TEMPERATURE: 97.8 F

## 2021-06-03 VITALS
HEIGHT: 71 IN | BODY MASS INDEX: 26.18 KG/M2 | OXYGEN SATURATION: 97 % | SYSTOLIC BLOOD PRESSURE: 131 MMHG | HEART RATE: 59 BPM | DIASTOLIC BLOOD PRESSURE: 87 MMHG | WEIGHT: 187 LBS

## 2021-06-03 VITALS
TEMPERATURE: 98.2 F | SYSTOLIC BLOOD PRESSURE: 128 MMHG | WEIGHT: 186 LBS | BODY MASS INDEX: 26.04 KG/M2 | HEIGHT: 71 IN | HEART RATE: 74 BPM | DIASTOLIC BLOOD PRESSURE: 80 MMHG | OXYGEN SATURATION: 97 %

## 2021-06-03 NOTE — TELEPHONE ENCOUNTER
Letter and copy of HST mailed to patient's address on file.    Ursula Servin CMA 11/18/2019 1:59 PM

## 2021-06-03 NOTE — TELEPHONE ENCOUNTER
The patient's HST did not show that he had obstructive sleep apnea. Please call patient to inform him of the result and offer him the option of cancelling the follow up visit with me in December. Thanks.

## 2021-06-03 NOTE — TELEPHONE ENCOUNTER
Patient's phone is not in service. Will mail a a letter with advice below.    Ursula Servin CMA 11/18/2019 1:55 PM

## 2021-06-03 NOTE — PROGRESS NOTES
"Dear Dr. Monge, Yanique Trevino MD  [address] [place] [address],    Thank you for the opportunity to participate in the care of Andre Garay.     He is a 44 y.o.  male patient who comes to the sleep medicine clinic for review of his sleep study. The study was completed on 11/13/2019 which showed that the patient had snoring but did not rule in for obstructive sleep apnea.  I explained to the patient that the home apnea test cannot rule out obstructive sleep apnea.  The patient reports that he still suffers from excessive daytime sleepiness as well as witnessed apnea followed by loud snoring.  He is interested in pursuing an in lab sleep study to be certain that he does not have obstructive sleep apnea.    Assessment and Plan:  In summary Andre Garay is a 44 y.o. year old male here for review of his sleep study.  1. Suspected sleep apnea  Due to the fact that the patient has an elevated [place] score of 17 and he still suffers from symptomatologies as described above, I will put an order for the patient to get an in lab split-night nocturnal polysomnogram.  - Split Night Sleep Study; Future  2. Hypersomnia  3. Snoring  4. Non-restorative sleep    Current Outpatient Medications   Medication Sig Dispense Refill     ibuprofen (ADVIL) 200 MG tablet Take 200 mg by mouth every 6 (six) hours as needed for pain or headaches.       No current facility-administered medications for this visit.        No Known Allergies    Epworths Sleepiness Scale 10/7/2019   Sitting and reading 3   Watching TV 3   Sitting, inactive in a public place (e.g. a theatre or a meeting) 2   As a passenger in a car for an hour without a break 3   Lying down to rest in the afternoon when circumstances permit 3   Sitting and talking to someone 1   Sitting quietly after a lunch without alcohol 1   In a car, while stopped for a few minutes in traffic 1   Total score 17       Physical Exam:  /79   Pulse (!) 52   Ht 5' 11\" " (1.803 m)   Wt 184 lb (83.5 kg)   SpO2 98%   BMI 25.66 kg/m    BMI:Body mass index is 25.66 kg/m .   GEN: NAD, obese  Psych: normal mood, normal affect     Labs/Studies:  - We reviewed the results of the overnight PSG as described on the HPI.     No results found for: FERRITIN     Patient verbalized understanding of these issues, agrees with the plan and all questions were answered today. Patient was given an opportuntity to voice any other symptoms or concerns not listed above. Patient did not have any other symptoms or concerns.        Gurjit Owens DO  Board Certified in Internal Medicine and Sleep Medicine  [facility].    We spent a total of 10 minutes of face-to-face encounter and more than 50% of the encounter was used for counseling or coordination of care.    (Note created with Dragon voice recognition and unintended spelling errors and word substitutions may occur)

## 2021-06-03 NOTE — TELEPHONE ENCOUNTER
----- Message from Ham Mobley, PSGT sent at 11/11/2019  9:46 AM CST -----  Hi, this patient's in lab study has been denied. Would you like to do the peer to peer review, or change it to a HST?    Thanks,     Ham

## 2021-06-04 VITALS
BODY MASS INDEX: 25.76 KG/M2 | WEIGHT: 184 LBS | OXYGEN SATURATION: 98 % | HEIGHT: 71 IN | SYSTOLIC BLOOD PRESSURE: 121 MMHG | HEART RATE: 52 BPM | DIASTOLIC BLOOD PRESSURE: 79 MMHG

## 2021-06-04 VITALS
RESPIRATION RATE: 20 BRPM | HEART RATE: 70 BPM | SYSTOLIC BLOOD PRESSURE: 126 MMHG | TEMPERATURE: 97.5 F | DIASTOLIC BLOOD PRESSURE: 80 MMHG | BODY MASS INDEX: 26.18 KG/M2 | WEIGHT: 187 LBS | HEIGHT: 71 IN

## 2021-06-04 VITALS
OXYGEN SATURATION: 98 % | WEIGHT: 184 LBS | DIASTOLIC BLOOD PRESSURE: 82 MMHG | BODY MASS INDEX: 25.66 KG/M2 | SYSTOLIC BLOOD PRESSURE: 128 MMHG | HEART RATE: 58 BPM

## 2021-06-05 VITALS
WEIGHT: 179.06 LBS | HEIGHT: 71 IN | SYSTOLIC BLOOD PRESSURE: 138 MMHG | OXYGEN SATURATION: 99 % | HEART RATE: 60 BPM | DIASTOLIC BLOOD PRESSURE: 80 MMHG | BODY MASS INDEX: 25.07 KG/M2

## 2021-06-06 NOTE — PROGRESS NOTES
Dear Dr. Monge, Yanique Trevino MD  [address] [place] [address],    Thank you for the opportunity to participate in the care of Andre Garay.     He is a 44 y.o.  male patient who comes to the sleep medicine clinic for review of his sleep study. The study was completed on 02/12/2020 which showed that the patient had upper airway resistance syndrome as well as periodic limb movement of sleep.  It is possible that the patient might have ruled in for obstructive sleep apnea if he had more supine sleep.    Assessment and Plan:    1. Upper airway resistance syndrome  I educated the patient on the underlying pathophysiology of upper airway resistance syndrome.  We discussed the treatment options available including oral appliance, positional therapy, weight loss, pressure therapy and muscle strengthening exercises.  He would like a list of the dentist who provide oral appliances.  He may consider positional therapy for now.  If his symptoms fail to improve in 3 months, we may consider a repeat sleep study.    2. Hypersomnia    3. Sleep-related movement disorder  Most likely secondary to untreated sleep disordered breathing.  Most likely will resolve after optimal treatment of upper airway resistance syndrome.    Current Outpatient Medications   Medication Sig Dispense Refill     ibuprofen (ADVIL) 200 MG tablet Take 200 mg by mouth every 6 (six) hours as needed for pain or headaches.       No current facility-administered medications for this visit.        No Known Allergies    Epworths Sleepiness Scale 10/7/2019   Sitting and reading 3   Watching TV 3   Sitting, inactive in a public place (e.g. a theatre or a meeting) 2   As a passenger in a car for an hour without a break 3   Lying down to rest in the afternoon when circumstances permit 3   Sitting and talking to someone 1   Sitting quietly after a lunch without alcohol 1   In a car, while stopped for a few minutes in traffic 1   Total score 17        Physical Exam:  /82   Pulse (!) 58   Wt 184 lb (83.5 kg)   SpO2 98%   BMI 25.66 kg/m    BMI:Body mass index is 25.66 kg/m .   GEN: NAD  Psych: normal mood, normal affect     Labs/Studies:  - We reviewed the results of the overnight PSG as described on the HPI.       Patient verbalized understanding of these issues, agrees with the plan and all questions were answered today. Patient was given an opportuntity to voice any other symptoms or concerns not listed above. Patient did not have any other symptoms or concerns.        Gurjit Owens DO  Board Certified in Internal Medicine and Sleep Medicine      (Note created with Dragon voice recognition and unintended spelling errors and word substitutions may occur)

## 2021-06-06 NOTE — PATIENT INSTRUCTIONS - HE
Measures aimed at reducing upper airway resistance are recommended for the degree of sleep-disordered breathing that was observed in this study. Options include: positional therapy to avoid sleep in the supine posture, ENT evaluation for surgery, weight loss, muscle strengthening exercises and dental evaluation for an oral appliance.      [facility]    [facility]  [address] [address] [phone]    [facility]    Chelita Barnhart D[place], [place]  [address] [place] [address]  [phone]    [address]  [place] [address]  [phone]    [facility].  German Carter DDS, MS, PhD  [phone]

## 2021-06-07 NOTE — TELEPHONE ENCOUNTER
"Who is calling:  Patient  Reason for Call:  Patient is having a dental emergency/infection in his tooth/however since patient has traveled to [place] and [place] the Dentist will not see him unless he is tested for Covid 19. I explained we are not testing at this time and the best I could do is send his PCP a message. Per patient he is not having syptoms/he has called the phone number for Covid questions and they referred him to call his PCP. He also went to [email or web address] and the first question being was \"when did your symptoms start?\" He does not have symptoms.  travel date was to [place]  3/8-3/13 then traveled to [place] was 3/16/-3/21 he then flew to [place].   Date of last appointment with primary care: 4/29/19  Okay to leave a detailed message: Yes      "

## 2021-06-07 NOTE — TELEPHONE ENCOUNTER
He has a tooth infection but his dentist does not have any infection gear.  Started with a toothache on Wednesday and not getting better.  He traveled in the last 2 weeks but no symptoms.  He went through [facility] protocol and could not get tested.  He is taking antibiotics.      I advised pt to continue on antibiotics / use antibiotic mouthwash and try to wait a week in isolation and see if dentist will work on him then.  He could check to see if any free standing clinics or independent firms on doing any testing.    Pt agrees.    Yanique Monge

## 2021-06-10 NOTE — PROGRESS NOTES
MALE ADULT PREVENTIVE EXAM    CHIEF COMPLAINT:  Male preventive exam.    SUBJECTIVE:  Andre Garay is a 44 y.o. male.  He has the following complaints:  1) elevated cholesterol - LDL this year up to 174.  Has not changed his diet much.  Brothers have high cholesterol.  He does like sweets and drinks alcohol daily.  Prefers not to take statin.  Had coronary calcium scan with score of zero.  2) screen for prostate cancer  3) Brother had colon polyp.  No family history of colon cancer.      PAST MEDICAL & SURGICAL HISTORY:   Patient has Hypercholesteremia and Screening for prostate cancer on their problem list..  He  has a past surgical history that includes Shoulder surgery; Knee surgery (2002); and Crowns..  He has a current medication list which includes the following prescription(s): ibuprofen..  No Known Allergies    No problem-specific Assessment & Plan notes found for this encounter.      HABITS & SOCIAL HISTORY:   He  reports that he has never smoked. He has never used smokeless tobacco.  He  reports current alcohol use of about 6.0 - 7.0 standard drinks of alcohol per week.  .  Works as consultant in leadership training.  Has 2 children from previous marriage.      FAMILY HISTORY:  His family history includes Alzheimer's disease in his mother; Hyperlipidemia in his brother and father; Other in his father and sister; Snoring in his father.    PRIOR LABS:  Lab Results   Component Value Date    WBC 7.5 08/06/2015    HGB 14.5 08/06/2015    HCT 41.7 08/06/2015    MCV 88 08/06/2015     08/06/2015     08/24/2020    K 4.4 08/24/2020    BUN 13 08/24/2020     Lab Results   Component Value Date    CHOL 252 (H) 08/24/2020    HDL 51 08/24/2020    LDLCALC 174 (H) 08/24/2020    TRIG 135 08/24/2020     No results found for: TSH  No components found for: GLUC      RISK BEHAVIORS AND HEALTH HABITS:  Seat Belt Use: YES  Guns: NO  Dental Care: YES    REVIEW OF SYSTEMS:  Complete head to toe review of  "systems is otherwise negative except as above.    OBJECTIVE:  VITAL SIGNS:  /80 (Patient Site: Right Arm, Patient Position: Sitting, Cuff Size: Adult Regular)   Pulse 70   Temp 97.5  F (36.4  C) (Oral)   Resp 20   Ht 5' 10.87\" (1.8 m)   Wt 187 lb (84.8 kg)   BMI 26.18 kg/m    GENERAL:  Patient alert, in NAD  EYES: PERRLA. Extraoccular movements intact, pupils equal, reactive to light and accommodation.  Normal conjunctiva and lids.    ENT:  Hearing grossly normal.  Normal appearance to ears and nose.  Bilateral TM s, external canals, oropharynx normal. Normal lips, gums and teeth.    NECK:  Supple, without thyromegaly or mass.  RESP:  Clear to auscultation without crackles, wheezes or distress.  Normal respiratory effort.   CV:  Regular rate and rhythm without murmurs, rubs or gallops.  Normal pedal pulses.  No varicosities or edema.  ABDOMEN:  Soft, non-tender, without hepatosplenomegaly, masses, or hernias.    : Prostate is smooth, not enlarged with no nodules.  LYMPHATIC: No cervical lymphadenopathy.  No bruising.  NEURO:  CN II-XII intact, motor & sensory function all intact.  DTR and reflexes normal.  PSYCHIATRIC:  Alert & oriented with normal mood and affect.  Good judgment and insight.  SKIN:  Normal inspection and palpation.  MUSCULOSKELETAL: Normal gait and station.  - Spine / Ribs / Pelvis: Normal inspection, ROM, stability and strength: Spine, Head, Neck, Upper and Lower Extremities.      Andre was seen today for annual exam.    Diagnoses and all orders for this visit:    Routine general medical examination at a health care facility  Up to date with colon cancer screening / tetanus.    Screening for prostate cancer  -     PSA, Annual Screen (Prostatic-Specific Antigen)    Hypercholesteremia - LDL elevated but coronary calcium score is zero.  He will cut down on sweets/ alcohol / dairy and meat and recheck fasting lipids in 3 months.  Increase vegetables/ fiber in diet.         Yanique Trevino " [place]

## 2021-06-12 NOTE — PROGRESS NOTES
MALE ADULT PREVENTIVE EXAM    CHIEF COMPLAINT:  Male preventive exam.    SUBJECTIVE:  Andre Garay is a 41 y.o. male.  He has the following complaints:  1) He has had trouble with inflammation in his right knee.  He has seen rheumatology in [facility].  He will get periodic swelling of the right knee.  He did have the knee aspirated   2) Right shoulder- he had shoulder displacement in the past.  3) He has been on antihypertensives in the past but BP has been well off the meds.  At the time he was having panic attacks.  4) He does have a history of elevated cholesterol.  He eats healthy.  He does drink wine/beer with meals.  Father/brothers have elevated cholesterol.    PAST MEDICAL & SURGICAL HISTORY:   Patient  does not have any active problems on file..  He  has a past surgical history that includes Shoulder surgery; Knee surgery (2002); and Crowns..  He has a current medication list which includes the following prescription(s): ibuprofen..  No Known Allergies    No problem-specific Assessment & Plan notes found for this encounter.      HABITS & SOCIAL HISTORY:  but wife wants divorce.  2 children from first marriage.  Works as consultant in leadership/ training.  Exercise- plays soccer 1-2 x a week.  Diet- balanced.  Alcohol- 1-2 drinks/ evening.  He  reports that he has never smoked. He has never used smokeless tobacco.  He  reports that he drinks about 3.6 - 4.2 oz of alcohol per week       FAMILY HISTORY:  His family history includes Alzheimer's disease in his mother; Hyperlipidemia in his brother and father; Other in his father and sister. There is no history of Heart disease.    PRIOR LABS:  Lab Results   Component Value Date    WBC 7.5 08/06/2015    HGB 14.5 08/06/2015    HCT 41.7 08/06/2015    MCV 88 08/06/2015     08/06/2015     08/25/2016    K 4.5 08/25/2016    BUN 12 08/25/2016     Lab Results   Component Value Date    CHOL 256 (H) 08/25/2016    HDL 50 08/25/2016    LDLCALC 182  "(H) 08/25/2016    TRIG 120 08/25/2016     No results found for: TSH  No components found for: GLUC    Supplements: no    RISK BEHAVIORS AND HEALTH HABITS:  Seat Belt Use: YES  Guns: NO  Dental Care: YES    REVIEW OF SYSTEMS:  Complete head to toe review of systems is otherwise negative except as above.    OBJECTIVE:  VITAL SIGNS:  /80 (Patient Site: Left Arm, Patient Position: Sitting, Cuff Size: Adult Regular)  Pulse 60  Resp 12  Ht 5' 11.25\" (1.81 m)  Wt 191 lb (86.6 kg)  BMI 26.45 kg/m2  GENERAL:  Patient alert, in NAD  EYES: PERRLA. Extraoccular movements intact, pupils equal, reactive to light and accommodation.  Normal conjunctiva and lids.    ENT:  Hearing grossly normal.  Normal appearance to ears and nose.  Bilateral TM s, external canals, oropharynx normal. Normal lips, gums and teeth.    NECK:  Supple, without thyromegaly or mass.  RESP:  Clear to auscultation without crackles, wheezes or distress.  Normal respiratory effort.   CV:  Regular rate and rhythm without murmurs, rubs or gallops.  Normal pedal pulses.  No varicosities or edema.  ABDOMEN:  Soft, non-tender, without hepatosplenomegaly, or hernias.  1 cm firm lump in RUQ that is not tender.  LYMPHATIC: No cervical lymphadenopathy.  No bruising.  NEURO:  CN II-XII intact, motor & sensory function all intact.  DTR and reflexes normal.  PSYCHIATRIC:  Alert & oriented with normal mood and affect.  Good judgment and insight.  SKIN:  Normal inspection and palpation.  MUSCULOSKELETAL: Normal gait and station.  - Spine / Ribs / Pelvis: Normal inspection, ROM, stability and strength: Spine, Head, Neck, Upper and Lower Extremities.      Andre was seen today for annual exam, questions for providers/results, establish care and 6 month nurse navigator follow up.    Diagnoses and all orders for this visit:    Routine general medical examination at a health care facility  He did have recent tetanus.    Had PSA last year and would like to continue " screening.    Hypercholesterolemia- Check lipid particle size and evaluate for inflammation.  F/U after testing to discuss results.  -     C -Reactive Protein, High Sensitivity  -     NMR with Lipid  -     Comprehensive Metabolic Panel    Screening for prostate cancer  -     PSA (Prostatic-Specific Antigen), Annual Screen         Yanique Monge

## 2021-06-13 NOTE — PROGRESS NOTES
SUBJECTIVE: Andre Garay is a 42 y.o. male with:  Chief Complaint   Patient presents with     Follow-up     blood work, review results    He is here to f/u on labs and discuss nutrition/ supplements.  He has had elevated cholesterol in the past.  He tries to eat a healthy diet with unprocessed foods.  Currently not taking any supplements.  No FH of diabetes mellitus or heart disease.    OBJECTIVE: /80 (Patient Site: Right Arm, Patient Position: Sitting, Cuff Size: Adult Regular)  Pulse 68  Resp 16  Wt 190 lb (86.2 kg)  BMI 26.31 kg/m2 no distress    LDL- 139  HDL- 52  See NMR profile for more info.    Andre was seen today for follow-up.    Diagnoses and all orders for this visit:    Hypercholesteremia     We reviewed all his labs from last visit including a detailed look at his NMR profile.  His hs CRP is average range.  He does have intermediate risk for ASCVD based on particle size but increased risk for insulin sensitivity.  His fasting glucose is under 100.  His 10 year risk for ASCVD based on Calais calculator is 6%.    We discussed cutting back on sugar and processed carbs.  I recommend the Mediterranean diet rich in fruits/ vegetables/ nuts / beans/ fish.  We discussed how alcohol is metabolized to sugar in the body so to watch the amount in diet.  I also recommended some basic supplements for him. I also discussed how pesticides/ herbicides in food and contribute to inflammation/ toxicity in the body and chronic diseases.  I will have him f/u in 1 year to recheck labs.    Patient Instructions   To order supplements go to the web site: [employer]  Use my authorization number to order the recommended supplements: S99    ProThera VitaPrime Iron-free multivitamins with minerals 1 twice daily    Marine Fish oil 1 daily        Dirty Dozen  Clean Fifteen    [email or web address]        20 minutes spent with the patient.  Over 50% were spent in counseling and coordination of care.    Yanique Trevino  [place]

## 2021-06-16 NOTE — PROGRESS NOTES
Andre was seen today for not feeling well since march 22, 2021, knee swelling since april 4th or 5th, 2021 and back pain.    Diagnoses and all orders for this visit:    Pain and swelling of right knee  -     HM2(CBC w/o Differential)  -     Rheumatoid Factor Quant  -     Erythrocyte Sedimentation Rate  -     C -Reactive Protein, High Sensitivity  -     Comprehensive Metabolic Panel  -     Lyme Antibody Cascade    Myalgia  -     Magnesium, Red Blood Cell    Will check labs then make recommendations.     SUBJECTIVE: Andre Garay is a 45 y.o. male with:  Chief Complaint   Patient presents with     Not feeling well since March 22, 2021     COVID test came back neg on the 25th of March, feel better then not feeling good again a day after giving COVID injections on March 27 until now     Knee swelling since April 4th or 5th, 2021     Back Pain     low back pain on and off since March 22nd.     He started running in October.  He was running several times a week and including one long run ( 11 miles ).  Went to [place] and had pain in his left ankle- saw [facility].  Had to stop running and did PT then resumed. He was up to 4 miles.  In March he finished running and felt very tired.  He felt his system shut down. Started to have body aches in back.  Going to [employer] and getting adjustments and craniosacral work. He did a COVID test due to fatigue and that was negative.  He ran again on March 29.  Now he feels his back muscles are very tense. He went on a trip to [place] last week but not feeling any better.    The right knee has pain laterally.  He developed swelling in the right knee last week but no redness or warmth.  No other swelling in any joints.  Right knee is getting better. He has appointment with [facility] tomorrow.  He did have arthroscopy in 2002 on the right knee- had mild meniscal tear. At the time he did have swelling.  Referred to rheumatology in [facility] and apparently he was felt to have  "rheum condition and medication was recommended but he did not take it.  The swelling resolved and did not come back until now.     Patient Active Problem List   Diagnosis     Hypercholesteremia      OBJECTIVE: /80 (Patient Site: Left Arm, Patient Position: Sitting, Cuff Size: Adult Regular)   Pulse 60   Ht 5' 10.87\" (1.8 m)   Wt 179 lb 1 oz (81.2 kg)   SpO2 99%   BMI 25.07 kg/m   no distress  Right knee: no obvious swelling / deformity / warmth / redness.  Slight decrease in range of motion.  Anterior drawer test with normal end point.  No varus or valgus laxity.    Yanique Monge     "

## 2021-06-16 NOTE — PROGRESS NOTES
"Andre Garay is a 45 y.o. male who is being evaluated via a billable video visit.      How would you like to obtain your AVS? MyChart.  If dropped from the video visit, the video invitation should be resent by: Send to e-mail at: [email or web address]  Will anyone else be joining your video visit? No      Video Start Time: 9:23 AM    Assessment & Plan     Andre was seen today for test results.    Diagnoses and all orders for this visit:    Elevated C-reactive protein (CRP)  -     Erythrocyte Sedimentation Rate; Future  -     C-Reactive Protein(CRP); Future    Pain and swelling of right knee      Patient Instructions   1) Continue on the supplement- Inflamma-Blox by [employer] for 30 days.  2) Follow anti-inflammatory diet  3) Stop running and walk/ bike to rest the knee  4) If no improvement or worse after 2 weeks, send me My Chart message and will refer to orthopedics  5) Recheck CRP/ ESR in 2 months- schedule lab only visit       16 minutes spent on the date of the encounter doing chart review, history and exam, documentation and further activities per the note       BMI:   Estimated body mass index is 25.07 kg/m  as calculated from the following:    Height as of 4/12/21: 5' 10.87\" (1.8 m).    Weight as of 4/12/21: 179 lb 1 oz (81.2 kg).       Return in about 2 weeks (around 5/6/2021) for Send me My Chart Message.    Yanique Monge MD  [facility]    Subjective   Andre Garay is 45 y.o. and presents today for the following health issues   HPI  Follow-up right knee pain/ elevated ESR/ CRP.  He has been going to PT for ankle issue and mentioned his knee pain but was not evaluated for that.  Was advised to do biking and cut back on running.  Also given foam roller to use.  He wants to run marathon in December.  His back is better.  He feels like there is mind-body connection with his pain.  In regards to inflammation, he did have dental infection / extraction / " implant last year but no problems on dental check few months ago.  He feels he eats healthy diet.  No digestive issues.  He started on InflammaBlox supplement.  Wonders about aspiration of joint fluid of the knee if not improving.      Review of Systems  no      Objective       Vitals:  No vitals were obtained today due to virtual visit.    Physical Exam  GENERAL: Healthy, alert and no distress  EYES: Eyes grossly normal to inspection. No discharge or erythema, or obvious scleral/conjunctival abnormalities.  RESP: No audible wheeze, cough, or visible cyanosis.  No visible retractions or increased work of breathing.    NEURO: Cranial nerves grossly intact. Mentation and speech appropriate for age.  PSYCH: Mentation appears normal, affect normal/bright, judgement and insight intact, normal speech and appearance well-groomed          Video-Visit Details    Type of service:  Video Visit    Video End Time (time video stopped): 9:33 AM  Originating Location (pt. Location): Home    Distant Location (provider location):  [facility]     Platform used for Video Visit: Private Outlet

## 2021-06-16 NOTE — PATIENT INSTRUCTIONS - HE
1) Continue on the supplement- Inflamma-Blox by [employer] for 30 days.  2) Follow anti-inflammatory diet  3) Stop running and walk/ bike to rest the knee  4) If no improvement or worse after 2 weeks, send me My Chart message and will refer to orthopedics  5) Recheck CRP/ ESR in 2 months- schedule lab only visit

## 2021-06-17 NOTE — PATIENT INSTRUCTIONS - HE
Patient Instructions by Gurjit Owens DO at 10/7/2019 10:20 AM     Author: Gurjit Owens DO Service: -- Author Type: Physician    Filed: 10/7/2019 10:57 AM Encounter Date: 10/7/2019 Status: Signed    : Gurjit Owens DO (Physician)         Patient education: What is a sleep study?     What is a sleep study? -- A sleep study is a test that measures how well you sleep and checks for sleep problems. For some sleep studies, you stay overnight in a sleep lab at a hospital or sleep center.     What happens during a sleep study? -- Before you go to sleep, a technician attaches small, sticky patches called electrodes to your head, chest, and legs. He or she will also place a small tube beneath your nose and might wrap 1 or 2 belts around your chest.   Each of these items has wires that connect to monitors. The monitors record your movement, brain activity, breathing, and other body functions while you sleep.  If you have a history of trouble falling asleep, your doctor might prescribe a medicine to help you fall asleep in the lab. If you have never taken the medicine before, your doctor might ask you take it on a night before your sleep study to see how it affects you.   Why might my doctor order a sleep study? -- Your doctor will order a sleep study if he or she thinks you have sleep apnea or a different condition that makes you:   ?Have sudden jerking leg movements while you sleep, called periodic limb movements.   ?Feel very sleepy during the day and fall asleep all of a sudden, called narcolepsy.   ?Have trouble falling asleep or staying asleep over a long period of time, called chronic insomnia.   ?Do odd things while you sleep, such as walking.  How should I prepare for a sleep study? -- On the day of your sleep study, you should:   ?Avoid alcohol   ?Avoid drinking coffee, tea, sodas, and other drinks that have caffeine in the afternoon and evening   ?Take all of your regular medicines    The cost of  care estimate line is [phone]. They are able to give the patient an estimate of the charges and also an estimate of their insurance coverage/patient responsibility.  After your sleep study is performed, please call us at [phone] to schedule for a follow up to review the results of the sleep study.    Please bring one tab of low dose melatonin 3 mg or less to the night of the study.    If the patient wishes to take the melatonin. It is completely voluntary.    Patient may take own melatonin after arrival to sleep center. Do not drive or operate machinery after intake of melatonin.

## 2021-06-17 NOTE — PATIENT INSTRUCTIONS - HE
Patient Instructions by Gurjit Owens DO at 12/4/2019 10:00 AM     Author: Gurjit Owens DO Service: -- Author Type: Physician    Filed: 12/4/2019 10:23 AM Encounter Date: 12/4/2019 Status: Signed    : Gurjit Owens DO (Physician)         Patient education: What is a sleep study?     What is a sleep study? -- A sleep study is a test that measures how well you sleep and checks for sleep problems. For some sleep studies, you stay overnight in a sleep lab at a hospital or sleep center.     What happens during a sleep study? -- Before you go to sleep, a technician attaches small, sticky patches called electrodes to your head, chest, and legs. He or she will also place a small tube beneath your nose and might wrap 1 or 2 belts around your chest.   Each of these items has wires that connect to monitors. The monitors record your movement, brain activity, breathing, and other body functions while you sleep.  If you have a history of trouble falling asleep, your doctor might prescribe a medicine to help you fall asleep in the lab. If you have never taken the medicine before, your doctor might ask you take it on a night before your sleep study to see how it affects you.   Why might my doctor order a sleep study? -- Your doctor will order a sleep study if he or she thinks you have sleep apnea or a different condition that makes you:   ?Have sudden jerking leg movements while you sleep, called periodic limb movements.   ?Feel very sleepy during the day and fall asleep all of a sudden, called narcolepsy.   ?Have trouble falling asleep or staying asleep over a long period of time, called chronic insomnia.   ?Do odd things while you sleep, such as walking.  How should I prepare for a sleep study? -- On the day of your sleep study, you should:   ?Avoid alcohol   ?Avoid drinking coffee, tea, sodas, and other drinks that have caffeine in the afternoon and evening   ?Take all of your regular medicines    The cost of  care estimate line is [phone]. They are able to give the patient an estimate of the charges and also an estimate of their insurance coverage/patient responsibility.  After your sleep study is performed, please call us at [phone] to schedule for a follow up to review the results of the sleep study.    Please bring one tab of low dose melatonin 3 mg or less to the night of the study.    Melatonin intake is completely voluntary.    You may take own melatonin after arrival to sleep center. Do not drive or operate machinery after intake of melatonin.

## 2021-06-19 NOTE — LETTER
Letter by Gurjit Owens DO at      Author: Gurjit Owens DO Service: -- Author Type: --    Filed:  Encounter Date: 11/18/2019 Status: Signed         Andre Garay  [address] [place] [address]      November 18, 2019      Dear Mr. Raymundokirti,    Dr. Owens has reviewed your home sleep test and it does not show that you have obstructive sleep apnea. Please call our office if you wish to cancel your follow up appointment on 12/4/19. I have also attached a copy of your sleep study results.    Thank you,  Ursula MURPYH CMA

## 2021-06-20 NOTE — PROGRESS NOTES
MALE ADULT PREVENTIVE EXAM    CHIEF COMPLAINT:  Male preventive exam.    SUBJECTIVE:  nAdre Garay is a 42 y.o. male.  He has the following complaints:  Has some low back pain after playing soccer.  Shiatsu therapy helped in the past.  He is concerned about his elevated LDL cholesterol - does eat cheese every day.    PAST MEDICAL & SURGICAL HISTORY:   Patient has Hypercholesteremia and Screening for prostate cancer on his problem list..  He  has a past surgical history that includes Shoulder surgery; Knee surgery (2002); and Crowns..  He has a current medication list which includes the following prescription(s): ibuprofen..  No Known Allergies    No problem-specific Assessment & Plan notes found for this encounter.      HABITS & SOCIAL HISTORY: .  2 children from first marriage.  Works as consultant in leadership/ training.  Exercise- plays soccer 1-2 x a week.  Diet- balanced.  Alcohol- 1-2 drinks/ evening.  He  reports that he has never smoked. He has never used smokeless tobacco.  He  reports that he drinks about 3.6 - 4.2 oz of alcohol per week       FAMILY HISTORY:  His family history includes Alzheimer's disease in his mother; Hyperlipidemia in his brother and father; Other in his father and sister. There is no history of Heart disease.    PRIOR LABS:  Lab Results   Component Value Date    WBC 7.5 08/06/2015    HGB 14.5 08/06/2015    HCT 41.7 08/06/2015    MCV 88 08/06/2015     08/06/2015     08/23/2018    K 4.4 08/23/2018    BUN 14 08/23/2018     Lab Results   Component Value Date    CHOL 235 (H) 08/23/2018    HDL 58 08/23/2018    LDLCALC 160 (H) 08/23/2018    TRIG 84 08/23/2018     No results found for: TSH  No components found for: GLUC      RISK BEHAVIORS AND HEALTH HABITS:  Seat Belt Use: YES  Dental Care: YES    REVIEW OF SYSTEMS:  Complete head to toe review of systems is otherwise negative except as above.    OBJECTIVE:  VITAL SIGNS:  /74 (Patient Site: Left Arm, Patient  "Position: Sitting, Cuff Size: Adult Large)  Pulse 62  Ht 5' 11\" (1.803 m)  Wt 183 lb (83 kg)  SpO2 98%  BMI 25.52 kg/m2  GENERAL:  Patient alert, in NAD  EYES: PERRLA. Extraoccular movements intact, pupils equal, reactive to light and accommodation.  Normal conjunctiva and lids.    ENT:  Hearing grossly normal.  Normal appearance to ears and nose.  Bilateral TM s, external canals, oropharynx normal. Normal lips, gums and teeth.    NECK:  Supple, without thyromegaly or mass.  RESP:  Clear to auscultation without crackles, wheezes or distress.  Normal respiratory effort.   CV:  Regular rate and rhythm without murmurs, rubs or gallops.  Normal pedal pulses.  No varicosities or edema.  ABDOMEN:  Soft, non-tender, without hepatosplenomegaly, masses, or hernias.    :  Prostate is smooth, not enlarged with no nodules.  LYMPHATIC: No cervical lymphadenopathy.  No bruising.  NEURO:  CN II-XII intact, motor & sensory function all intact.  DTR and reflexes normal.  PSYCHIATRIC:  Alert & oriented with normal mood and affect.  Good judgment and insight.  SKIN:  Normal inspection and palpation.  MUSCULOSKELETAL: Normal gait and station.  - Spine / Ribs / Pelvis: Normal inspection, ROM, stability and strength: Spine, Head, Neck, Upper and Lower Extremities.      Andre was seen today for annual exam.    Diagnoses and all orders for this visit:    Routine general medical examination at a health care facility  SAUL and PSA negative.  Up to date with tetanus.    Hypercholesteremia- LDL elevated.  HS CRP is normal.  Discussed cutting back on saturated fats and increasing fiber/ plants in diet.  Recheck in 3 months.  -     Lipid [place]ascade; Future         Yanique Monge       "

## 2021-06-28 NOTE — PROGRESS NOTES
"Progress Notes by Gurjit Owens DO at 2/19/2020 10:21 AM     Author: Gurjit Owens DO Service: -- Author Type: Physician    Filed: 2/19/2020 10:23 AM Encounter Date: 2/19/2020 Status: Signed    : Gurjit Owens DO (Physician)        SLEEP STUDY INTERPRETATION  DIAGNOSTIC POLYSOMNOGRAPHY REPORT      Patient: HUMZA MARQUEZ  YOB: 1975  Study Date: 2/12/2020  MRN: [id number]  Referring Provider: Yanique Monge MD  Ordering Provider: Gurjit Owens    Indications for Polysomnography: The patient is a 44 y old Male who is 5' 11\" and weighs 184.0 lbs. His BMI is 25.8, [place] sleepiness scale 17.0 and neck circumference is 40.0 cm. No relevant medical history. A diagnostic polysomnogram was performed to evaluate for non-restorative sleep.    Polysomnogram Data: A full night polysomnogram recorded the standard physiologic parameters including EEG, EOG, EMG, ECG, nasal and oral airflow. Respiratory parameters of chest and abdominal movements were recorded with respiratory inductance plethysmography. Oxygen saturation was recorded by pulse oximetry. Hypopnea scoring rule used: 1B 4%.    Sleep Architecture: Sleep architecture was fragmented with all the stages of sleep represented. Significant elevated arousal index.  The total recording time of the polysomnogram was 482.3 minutes. The total sleep time was 427.0 minutes. Sleep latency was decreased at 4.0 minutes without the use of a sleep aid. REM latency was 103.5 minutes. Arousal index was increased at 40.9 arousals per hour. Sleep efficiency was increased at 88.5%. Wake after sleep onset was 51.0 minutes. The patient spent 9.6% of total sleep time in Stage N1, 58.9% in Stage N2, 8.9% in Stage N3, and 22.6% in REM. Time in REM supine was 0 minutes.    Respiration: The patients respiratory events were worst in supine position. The patients respiratory disturbance index was elevated at 16.3 events per hour suggestive of upper " airway resistance syndrome.    Events ? The polysomnogram revealed a presence of 13 obstructive, 0 central, and 0 mixed apneas resulting in an apnea index of 1.8 events per hour. There were 14 obstructive hypopneas and 0 central hypopneas resulting in an obstructive hypopnea index of 2.0 and central hypopnea index of 0 events per hour. The combined apnea/hypopnea index was 3.8 events per hour (central apnea/hypopnea index was 0 events per hour). The REM AHI was 0 events per hour. The supine AHI was 12.1 events per hour. The RERA index was 12.5 events per hour.  The RDI was 16.3 events per hour. Respiratory events were worst in supine position.    Snoring - was reported as mild-moderate loud.    Respiratory rate and pattern - was notable for normal.    Sustained Sleep Associated Hypoventilation - Transcutaneous carbon dioxide monitoring was not used.     Sleep Associated Hypoxia - (Greater than 5 minutes O2 sat at or below 88%) was not present. Baseline oxygen saturation was 96.4%. Lowest oxygen saturation was 87.5%. Time spent less than or equal to 88% was 0.1 minutes. Time spent less than or equal to 89% was 0.2 minutes.    Movement Activity: The patients periodic limb movement index was elevated at 20.2 events per hour.    Periodic Limb Activity - There were 144 PLMs during the entire study. The PLM index was 20.2 movements per hour. The PLM Arousal Index was 5.3 per hour.    REM EMG Activity - Excessive muscle activity was not present.    Nocturnal Behavior - Abnormal sleep related behaviors were not noted during sleep.     Bruxism - None apparent.    Cardiac Summary: No ventricular arrhythmias appreciated  The average pulse rate was 55.7 bpm. The minimum pulse rate was 45.9 bpm while the maximum pulse rate was 95.1 bpm.  Arrhythmias were not noted.      Assessment:      Sleep architecture was fragmented with all the stages of sleep represented. Significant elevated arousal index.    The patients respiratory  events were worst in supine position. The patients respiratory disturbance index was elevated at 16.3 events per hour suggestive of upper airway resistance syndrome.    The patients periodic limb movement index was elevated at 20.2 events per hour.    Recommendations:    Patient may be a candidate for dental appliance through referral to Sleep Dentistry for socially disruptive snoring.    May also consider positional therapy and muscle strengthening exercises..    May also consider further evaluation of limb movements in sleep if clinically indicated.    Diagnostic Codes:   Unspecified Sleep Disturbance G47.9  Snoring R06.83  Other sleep related limb movement disorders G47.69    2/12/2020 [facility] Sleep Study (184.0 lbs) - AHI 3.8, RDI 16.3, Supine AHI 12.1, REM AHI 0, Low O2 87.5%, Time Spent ?88% 0.1 minutes / Time Spent ?89% 0.2 minutes.      _____________________________________   Electronically Signed By: Gurjit Owens DO

## 2021-09-08 ENCOUNTER — MYC MEDICAL ADVICE (OUTPATIENT)
Dept: FAMILY MEDICINE | Facility: CLINIC | Age: 46
End: 2021-09-08

## 2021-09-08 DIAGNOSIS — E78.00 HYPERCHOLESTEREMIA: ICD-10-CM

## 2021-09-08 DIAGNOSIS — Z12.5 SCREENING FOR PROSTATE CANCER: ICD-10-CM

## 2021-09-08 DIAGNOSIS — R79.82 ELEVATED C-REACTIVE PROTEIN (CRP): Primary | ICD-10-CM

## 2021-09-13 ENCOUNTER — LAB (OUTPATIENT)
Dept: LAB | Facility: CLINIC | Age: 46
End: 2021-09-13
Payer: COMMERCIAL

## 2021-09-13 DIAGNOSIS — R79.82 ELEVATED C-REACTIVE PROTEIN (CRP): ICD-10-CM

## 2021-09-13 DIAGNOSIS — Z12.5 SCREENING FOR PROSTATE CANCER: ICD-10-CM

## 2021-09-13 DIAGNOSIS — E78.00 HYPERCHOLESTEREMIA: ICD-10-CM

## 2021-09-13 LAB
ALBUMIN SERPL-MCNC: 3.9 G/DL (ref 3.5–5)
ALP SERPL-CCNC: 61 U/L (ref 45–120)
ALT SERPL W P-5'-P-CCNC: 12 U/L (ref 0–45)
ANION GAP SERPL CALCULATED.3IONS-SCNC: 12 MMOL/L (ref 5–18)
AST SERPL W P-5'-P-CCNC: 17 U/L (ref 0–40)
BILIRUB SERPL-MCNC: 0.5 MG/DL (ref 0–1)
BUN SERPL-MCNC: 14 MG/DL (ref 8–22)
C REACTIVE PROTEIN LHE: 0.7 MG/DL (ref 0–0.8)
CALCIUM SERPL-MCNC: 9.8 MG/DL (ref 8.5–10.5)
CHLORIDE BLD-SCNC: 103 MMOL/L (ref 98–107)
CHOLEST SERPL-MCNC: 197 MG/DL
CO2 SERPL-SCNC: 25 MMOL/L (ref 22–31)
CREAT SERPL-MCNC: 1.06 MG/DL (ref 0.7–1.3)
ERYTHROCYTE [SEDIMENTATION RATE] IN BLOOD BY WESTERGREN METHOD: 12 MM/HR (ref 0–15)
FASTING STATUS PATIENT QL REPORTED: YES
GFR SERPL CREATININE-BSD FRML MDRD: 83 ML/MIN/1.73M2
GLUCOSE BLD-MCNC: 98 MG/DL (ref 70–125)
HDLC SERPL-MCNC: 64 MG/DL
LDLC SERPL CALC-MCNC: 119 MG/DL
POTASSIUM BLD-SCNC: 4.8 MMOL/L (ref 3.5–5)
PROT SERPL-MCNC: 7.8 G/DL (ref 6–8)
SODIUM SERPL-SCNC: 140 MMOL/L (ref 136–145)
TRIGL SERPL-MCNC: 70 MG/DL

## 2021-09-13 PROCEDURE — 36415 COLL VENOUS BLD VENIPUNCTURE: CPT

## 2021-09-13 PROCEDURE — 99000 SPECIMEN HANDLING OFFICE-LAB: CPT

## 2021-09-13 PROCEDURE — 80053 COMPREHEN METABOLIC PANEL: CPT

## 2021-09-13 PROCEDURE — 85652 RBC SED RATE AUTOMATED: CPT

## 2021-09-13 PROCEDURE — 86141 C-REACTIVE PROTEIN HS: CPT

## 2021-09-13 PROCEDURE — 80061 LIPID PANEL: CPT

## 2021-09-13 PROCEDURE — 84153 ASSAY OF PSA TOTAL: CPT | Mod: 90

## 2021-09-13 PROCEDURE — 84154 ASSAY OF PSA FREE: CPT | Mod: 90

## 2021-09-14 ENCOUNTER — OFFICE VISIT (OUTPATIENT)
Dept: FAMILY MEDICINE | Facility: CLINIC | Age: 46
End: 2021-09-14
Payer: COMMERCIAL

## 2021-09-14 VITALS
BODY MASS INDEX: 23.65 KG/M2 | HEART RATE: 61 BPM | DIASTOLIC BLOOD PRESSURE: 68 MMHG | OXYGEN SATURATION: 97 % | RESPIRATION RATE: 16 BRPM | TEMPERATURE: 97.8 F | HEIGHT: 71 IN | WEIGHT: 168.9 LBS | SYSTOLIC BLOOD PRESSURE: 120 MMHG

## 2021-09-14 DIAGNOSIS — Z00.00 ROUTINE GENERAL MEDICAL EXAMINATION AT A HEALTH CARE FACILITY: Primary | ICD-10-CM

## 2021-09-14 LAB
PSA FREE MFR SERPL: 50 %
PSA FREE SERPL-MCNC: 0.1 NG/ML
PSA SERPL IA-MCNC: 0.2 NG/ML

## 2021-09-14 PROCEDURE — 90682 RIV4 VACC RECOMBINANT DNA IM: CPT | Performed by: FAMILY MEDICINE

## 2021-09-14 PROCEDURE — 90471 IMMUNIZATION ADMIN: CPT | Performed by: FAMILY MEDICINE

## 2021-09-14 PROCEDURE — 99396 PREV VISIT EST AGE 40-64: CPT | Mod: 25 | Performed by: FAMILY MEDICINE

## 2021-09-14 PROCEDURE — 96127 BRIEF EMOTIONAL/BEHAV ASSMT: CPT | Performed by: FAMILY MEDICINE

## 2021-09-14 RX ORDER — IBUPROFEN 200 MG
200 TABLET ORAL
COMMUNITY
End: 2022-10-17

## 2021-09-14 ASSESSMENT — MIFFLIN-ST. JEOR: SCORE: 1650.32

## 2021-09-14 NOTE — PROGRESS NOTES
MALE ADULT PREVENTIVE EXAM    Andre was seen today for physical.    Diagnoses and all orders for this visit:    Routine general medical examination at a health care facility    Other orders  -     WA RIV4 (FLUBLOK) VACCINE RECOMBINANT DNA PRSRV ANTIBIO FREE, IM      Lab work done previously.    CHIEF COMPLAINT:  Male preventive exam.    SUBJECTIVE:  Andre Garay is a 46 year old male.  He has the following complaints:  Elevated cholesterol - He has been training for marathon and working on his diet.  Has also cut back on alcohol.        PAST MEDICAL & SURGICAL HISTORY:   Patient [unfilled].  He  has a past surgical history that includes shoulder surgery; knee surgery (2002); and other surgical history..  He has a current medication list which includes the following prescription(s): ibuprofen and magic mouthwash..  No Known Allergies    No problem-specific Assessment & Plan notes found for this encounter.      HABITS & SOCIAL HISTORY: Single.  Has children part-time.  Works as consultant in leadership/ training.  He  reports that he has never smoked. He has never used smokeless tobacco.  He  reports current alcohol use of about 6.0 - 7.0 standard drinks of alcohol per week.      FAMILY HISTORY:  His family history includes Alzheimer Disease in his mother; Hyperlipidemia in his brother and father; Hypertension in his father; Lipids in his father; Other - See Comments in his father and sister; Snoring in his father.    PRIOR LABS:  Lab Results   Component Value Date    WBC 8.6 04/12/2021    HGB 14.8 04/12/2021    HCT 44.3 04/12/2021    MCV 86 04/12/2021     04/12/2021     09/13/2021    BUN 14 09/13/2021    CR 1.06 09/13/2021     Lab Results   Component Value Date    CHOL 197 09/13/2021    HDL 64 09/13/2021    TRIG 70 09/13/2021     No results found for: TSH  No components found for: GLUC        RISK BEHAVIORS AND HEALTH HABITS:  Seat Belt Use: YES  Dental Care: YES    REVIEW OF SYSTEMS:  Complete  "head to toe review of systems is otherwise negative except as above.    OBJECTIVE:  VITAL SIGNS:  /68 (BP Location: Left arm, Patient Position: Sitting, Cuff Size: Adult Regular)   Pulse 61   Temp 97.8  F (36.6  C) (Oral)   Resp 16   Ht 1.791 m (5' 10.5\")   Wt 76.6 kg (168 lb 14.4 oz)   SpO2 97%   BMI 23.89 kg/m    GENERAL:  Patient alert, in NAD  EYES: PERRLA. Extraoccular movements intact, pupils equal, reactive to light and accommodation.  Normal conjunctiva and lids.    ENT:  Hearing grossly normal.  Normal appearance to ears and nose.  Bilateral TM s, external canals, oropharynx normal. Normal lips, gums and teeth.    NECK:  Supple, without thyromegaly or mass.  RESP:  Clear to auscultation without crackles, wheezes or distress.  Normal respiratory effort.   CV:  Regular rate and rhythm without murmurs, rubs or gallops.  Normal pedal pulses.  No varicosities or edema.  ABDOMEN:  Soft, non-tender, without hepatosplenomegaly, masses, or hernias.    :  Normal scrotum.  Penis without lesions or discharge. Prostate is smooth, not enlarged with no nodules.  LYMPHATIC: No cervical lymphadenopathy.  No bruising.  NEURO:  CN II-XII intact, motor & sensory function all intact.  DTR and reflexes normal.  PSYCHIATRIC:  Alert & oriented with normal mood and affect.  Good judgment and insight.  SKIN:  Normal inspection and palpation.  MUSCULOSKELETAL: Normal gait and station.  - Spine / Ribs / Pelvis: Normal inspection, ROM, stability and strength: Spine, Head, Neck, Upper and Lower Extremities.      Answers for HPI/ROS submitted by the patient on 9/14/2021  Frequency of exercise:: 4-5 days/week  Getting at least 3 servings of Calcium per day:: Yes  Diet:: Low fat/cholesterol  Taking medications regularly:: Not Applicable  Medication side effects:: Not applicable  Bi-annual eye exam:: Yes  Dental care twice a year:: Yes  Sleep apnea or symptoms of sleep apnea:: None  Additional concerns today:: No  Duration of " exercise:: 45-60 minutes      Yanique Monge MD

## 2022-01-01 ENCOUNTER — NURSE TRIAGE (OUTPATIENT)
Dept: NURSING | Facility: CLINIC | Age: 47
End: 2022-01-01
Payer: COMMERCIAL

## 2022-01-02 NOTE — TELEPHONE ENCOUNTER
Patient called and has been diagnosed with covid.  Patient has a cough and a runny nose.  Denies fever, SOB and chest pain.      Dec 17th traveled to [facility].  Tested negative. Then went to [place] to ski with his son.  He started to get cold symptoms yesterday 12/31.  He tested today and he is positive.  Patient wanted to know what to expect, what he should do now and when should his family test.      Home care advise given.  Patient will follow home care advise.      COVID 19 Nurse Triage Plan/Patient Instructions    Please be aware that novel coronavirus (COVID-19) may be circulating in the community. If you develop symptoms such as fever, cough, or SOB or if you have concerns about the presence of another infection including coronavirus (COVID-19), please contact your health care provider or visit [email or web address].     Disposition/Instructions    Home care recommended. Follow home care protocol based instructions.    Thank you for taking steps to prevent the spread of this virus.  o Limit your contact with others.  o Wear a simple mask to cover your cough.  o Wash your hands well and often.    Resources    [facility]: About COVID-19: [email or web address]    CDC: What to Do If You're Sick: www.cdc.gov/coronavirus/2019-ncov/about/steps-when-sick.html    CDC: Ending Home Isolation: www.cdc.gov/coronavirus/2019-ncov/hcp/disposition-in-home-patients.html     CDC: Caring for Someone: www.cdc.gov/coronavirus/2019-ncov/if-you-are-sick/care-for-someone.html     [facility]: Interim Guidance for Hospital Discharge to Home: www.health.[facility].[place]/diseases/coronavirus/hcp/hospdischarge.pdf    [facility] clinical trials (COVID-19 research studies): [email or web address].[email or web address]/umn-clinical-trials     Below are the COVID-19 hotlines at the [place] Department of Health ([facility]). Interpreters are available.   o For health questions: Call 624-897-8890 or 1-305.367.1489 (7 a.m. to 7  p.m.)  o For questions about schools and childcare: Call 399-273-9902 or 1-950.741.4642 (7 a.m. to 7 p.m.)         Hortencia Good RN   01/01/22 11:30 PM  [facility] Nurse Advisor         Reason for Disposition    [1] COVID-19 diagnosed by positive lab test AND [2] mild symptoms (e.g., cough, fever, others) AND [3] no complications or SOB    Additional Information    Negative: SEVERE difficulty breathing (e.g., struggling for each breath, speaks in single words)    Negative: Difficult to awaken or acting confused (e.g., disoriented, slurred speech)    Negative: Bluish (or gray) lips or face now    Negative: Shock suspected (e.g., cold/pale/clammy skin, too weak to stand, low BP, rapid pulse)    Negative: Sounds like a life-threatening emergency to the triager    Negative: [1] COVID-19 exposure AND [2] no symptoms    Negative: COVID-19 vaccine reaction suspected (e.g., fever, headache, muscle aches) occurring 1 to 3 days after getting vaccine    Negative: COVID-19 vaccine, questions about    Negative: [1] Lives with someone known to have influenza (flu test positive) AND [2] flu-like symptoms (e.g., cough, runny nose, sore throat, SOB; with or without fever)    Negative: [1] Adult with possible COVID-19 symptoms AND [2] triager concerned about severity of symptoms or other causes    Negative: COVID-19 and breastfeeding, questions about    Negative: SEVERE or constant chest pain or pressure (Exception: mild central chest pain, present only when coughing)    Negative: MODERATE difficulty breathing (e.g., speaks in phrases, SOB even at rest, pulse 100-120)    Negative: [1] Headache AND [2] stiff neck (can't touch chin to chest)    Negative: MILD difficulty breathing (e.g., minimal/no SOB at rest, SOB with walking, pulse <100)    Negative: Chest pain or pressure    Negative: Patient sounds very sick or weak to the triager    Negative: Fever > 103 F (39.4 C)    Negative: [1] Fever > 101 F (38.3 C) AND [2] age >  60 years    Negative: [1] Fever > 100.0 F (37.8 C) AND [2] bedridden (e.g., nursing home patient, CVA, chronic illness, recovering from surgery)    Negative: HIGH RISK for severe COVID complications (e.g., age > 64 years, obesity with BMI > 25, pregnant, chronic lung disease or other chronic medical condition)  (Exception: Already seen by PCP and no new or worsening symptoms.)    Negative: [1] HIGH RISK patient AND [2] influenza is widespread in the community AND [3] ONE OR MORE respiratory symptoms: cough, sore throat, runny or stuffy nose    Negative: [1] HIGH RISK patient AND [2] influenza exposure within the last 7 days AND [3] ONE OR MORE respiratory symptoms: cough, sore throat, runny or stuffy nose    Negative: [1] COVID-19 infection suspected by caller or triager AND [2] mild symptoms (cough, fever, or others) AND [3] negative COVID-19 rapid test    Negative: Fever present > 3 days (72 hours)    Negative: [1] Fever returns after gone for over 24 hours AND [2] symptoms worse or not improved    Negative: [1] Continuous (nonstop) coughing interferes with work or school AND [2] no improvement using cough treatment per protocol    Negative: Cough present > 3 weeks    Negative: [1] COVID-19 diagnosed by positive lab test AND [2] NO symptoms (e.g., cough, fever, others)    Protocols used: CORONAVIRUS (COVID-19) DIAGNOSED OR [id number] 8.25.2021

## 2022-09-03 ENCOUNTER — HEALTH MAINTENANCE LETTER (OUTPATIENT)
Age: 47
End: 2022-09-03

## 2022-10-10 ENCOUNTER — IMMUNIZATION (OUTPATIENT)
Dept: NURSING | Facility: CLINIC | Age: 47
End: 2022-10-10
Payer: COMMERCIAL

## 2022-10-10 PROCEDURE — 90471 IMMUNIZATION ADMIN: CPT

## 2022-10-10 PROCEDURE — 91312 COVID-19,PF,PFIZER BOOSTER BIVALENT: CPT

## 2022-10-10 PROCEDURE — 0124A COVID-19,PF,PFIZER BOOSTER BIVALENT: CPT

## 2022-10-10 PROCEDURE — 90682 RIV4 VACC RECOMBINANT DNA IM: CPT

## 2022-10-17 ENCOUNTER — TELEPHONE (OUTPATIENT)
Dept: FAMILY MEDICINE | Facility: CLINIC | Age: 47
End: 2022-10-17

## 2022-10-17 ENCOUNTER — LAB (OUTPATIENT)
Dept: LAB | Facility: CLINIC | Age: 47
End: 2022-10-17
Payer: COMMERCIAL

## 2022-10-17 DIAGNOSIS — Z13.228 ENCOUNTER FOR SCREENING FOR OTHER METABOLIC DISORDERS: ICD-10-CM

## 2022-10-17 DIAGNOSIS — Z12.5 SCREENING FOR PROSTATE CANCER: ICD-10-CM

## 2022-10-17 DIAGNOSIS — Z12.5 SCREENING FOR PROSTATE CANCER: Primary | ICD-10-CM

## 2022-10-17 LAB
ALBUMIN SERPL BCG-MCNC: 4.6 G/DL (ref 3.5–5.2)
ALP SERPL-CCNC: 57 U/L (ref 40–129)
ALT SERPL W P-5'-P-CCNC: 19 U/L (ref 10–50)
ANION GAP SERPL CALCULATED.3IONS-SCNC: 8 MMOL/L (ref 7–15)
AST SERPL W P-5'-P-CCNC: 34 U/L (ref 10–50)
BILIRUB SERPL-MCNC: 0.5 MG/DL
BUN SERPL-MCNC: 12.9 MG/DL (ref 6–20)
CALCIUM SERPL-MCNC: 9.8 MG/DL (ref 8.6–10)
CHLORIDE SERPL-SCNC: 101 MMOL/L (ref 98–107)
CHOLEST SERPL-MCNC: 224 MG/DL
CREAT SERPL-MCNC: 1.01 MG/DL (ref 0.67–1.17)
DEPRECATED HCO3 PLAS-SCNC: 28 MMOL/L (ref 22–29)
GFR SERPL CREATININE-BSD FRML MDRD: >90 ML/MIN/1.73M2
GLUCOSE SERPL-MCNC: 97 MG/DL (ref 70–99)
HDLC SERPL-MCNC: 60 MG/DL
LDLC SERPL CALC-MCNC: 141 MG/DL
NONHDLC SERPL-MCNC: 164 MG/DL
POTASSIUM SERPL-SCNC: 4.9 MMOL/L (ref 3.4–5.3)
PROT SERPL-MCNC: 7.8 G/DL (ref 6.4–8.3)
PSA SERPL-MCNC: 0.22 NG/ML (ref 0–2.5)
SODIUM SERPL-SCNC: 137 MMOL/L (ref 136–145)
TRIGL SERPL-MCNC: 117 MG/DL

## 2022-10-17 PROCEDURE — 36415 COLL VENOUS BLD VENIPUNCTURE: CPT

## 2022-10-17 PROCEDURE — 80053 COMPREHEN METABOLIC PANEL: CPT

## 2022-10-17 PROCEDURE — G0103 PSA SCREENING: HCPCS

## 2022-10-17 PROCEDURE — 80061 LIPID PANEL: CPT

## 2022-10-17 NOTE — TELEPHONE ENCOUNTER
General Call      Reason for Call:  Labs    What are your questions or concerns:  Patient said that he talked to Dr Monge during his last appointment and she said she was going to order his routine labs so he could get them done a couple weeks before is next annual visit.  [facility] is currently [facility] location trying to get the labs done for his upcoming appointment on 11/03/22 and there are no lab orders placed.  Patient is very upset since he is already at the lab appointment and he has already fasted and was told there aren't any orders in there.   Patient is requesting the orders be placed asap as he is going out of town and would like to get them done today.    Please advise on lab orders.    Date of last appointment with provider: 09/14/21    Could we send this information to you in [facility] or would you prefer to receive a phone call?:   Patient would prefer a phone call   Okay to leave a detailed message?: Yes at Cell number on file:    Telephone Information:   Mobile [phone]

## 2022-12-12 ENCOUNTER — MYC MEDICAL ADVICE (OUTPATIENT)
Dept: FAMILY MEDICINE | Facility: CLINIC | Age: 47
End: 2022-12-12

## 2022-12-14 ENCOUNTER — LAB (OUTPATIENT)
Dept: LAB | Facility: CLINIC | Age: 47
End: 2022-12-14
Payer: COMMERCIAL

## 2022-12-14 DIAGNOSIS — Z11.4 SCREENING FOR HIV (HUMAN IMMUNODEFICIENCY VIRUS): ICD-10-CM

## 2022-12-14 DIAGNOSIS — Z11.59 NEED FOR HEPATITIS C SCREENING TEST: ICD-10-CM

## 2022-12-14 PROCEDURE — 86803 HEPATITIS C AB TEST: CPT

## 2022-12-14 PROCEDURE — 36415 COLL VENOUS BLD VENIPUNCTURE: CPT

## 2022-12-14 PROCEDURE — 87389 HIV-1 AG W/HIV-1&-2 AB AG IA: CPT

## 2022-12-15 LAB
HCV AB SERPL QL IA: NONREACTIVE
HIV 1+2 AB+HIV1 P24 AG SERPL QL IA: NONREACTIVE

## 2023-01-09 ASSESSMENT — ENCOUNTER SYMPTOMS
CONSTIPATION: 0
SORE THROAT: 0
PALPITATIONS: 0
FEVER: 0
WEAKNESS: 0
HEARTBURN: 0
DYSURIA: 0
DIZZINESS: 0
HEMATURIA: 0
COUGH: 0
FREQUENCY: 0
NERVOUS/ANXIOUS: 0
ABDOMINAL PAIN: 0
SHORTNESS OF BREATH: 0
HEADACHES: 0
HEMATOCHEZIA: 0
DIARRHEA: 0
JOINT SWELLING: 0
ARTHRALGIAS: 0
MYALGIAS: 0
PARESTHESIAS: 0
CHILLS: 0
NAUSEA: 0
EYE PAIN: 0

## 2023-01-15 ENCOUNTER — HEALTH MAINTENANCE LETTER (OUTPATIENT)
Age: 48
End: 2023-01-15

## 2023-01-16 ENCOUNTER — OFFICE VISIT (OUTPATIENT)
Dept: FAMILY MEDICINE | Facility: CLINIC | Age: 48
End: 2023-01-16
Payer: COMMERCIAL

## 2023-01-16 VITALS
RESPIRATION RATE: 16 BRPM | HEART RATE: 63 BPM | WEIGHT: 184 LBS | HEIGHT: 71 IN | DIASTOLIC BLOOD PRESSURE: 82 MMHG | OXYGEN SATURATION: 98 % | TEMPERATURE: 97.7 F | SYSTOLIC BLOOD PRESSURE: 138 MMHG | BODY MASS INDEX: 25.76 KG/M2

## 2023-01-16 DIAGNOSIS — Z00.00 HEALTH CARE MAINTENANCE: Primary | ICD-10-CM

## 2023-01-16 DIAGNOSIS — Z12.11 SCREEN FOR COLON CANCER: ICD-10-CM

## 2023-01-16 PROBLEM — E78.00 HYPERCHOLESTEREMIA: Status: ACTIVE | Noted: 2017-09-26

## 2023-01-16 PROCEDURE — 99396 PREV VISIT EST AGE 40-64: CPT | Performed by: FAMILY MEDICINE

## 2023-01-16 NOTE — PROGRESS NOTES
MALE ADULT PREVENTIVE EXAM      Problem List Items Addressed This Visit    None  Visit Diagnoses     Health care maintenance    -  Primary    Screen for colon cancer        Relevant Orders    Colonoscopy Screening  Referral        Normal SAUL / PSA level.    CHIEF COMPLAINT:  Male preventive exam.    SUBJECTIVE:  Andre Garay is a 47 year old male.  He has the following complaints:    1) Elevated cholesterol - His LDL did go up a little this year.  Has been eating more sweets and butter so back off.    2) He ran 4 marathons in last year.       PAST MEDICAL & SURGICAL HISTORY:   [facility]  He  has a past surgical history that includes shoulder surgery; knee surgery (01/01/2002); other surgical history; orthopedic surgery (March 2006); and Soft tissue surgery (June 2000)..  He currently has no medications in their medication list..  No Known Allergies    No problem-specific Assessment & Plan notes found for this encounter.      HABITS & SOCIAL HISTORY: He is single. 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  He  reports that he has never smoked. He has never been exposed to tobacco smoke. He has never used smokeless tobacco.  He  reports current alcohol use of about 6.0 - 7.0 standard drinks per week.      FAMILY HISTORY:  His family history includes Alzheimer Disease in his mother; Hyperlipidemia in his brother and father; Hypertension in his father; Lipids in his father; Mental Illness in his mother; Other - See Comments in his father and sister; Snoring in his father.    PRIOR LABS:  Lab Results   Component Value Date    WBC 8.6 04/12/2021    HGB 14.8 04/12/2021    HCT 44.3 04/12/2021    MCV 86 04/12/2021     04/12/2021     10/17/2022    BUN 12.9 10/17/2022    CR 1.01 10/17/2022     Lab Results   Component Value Date    CHOL 224 (H) 10/17/2022    HDL 60 10/17/2022    TRIG 117 10/17/2022     No results found for: TSH  No components found for: GLUC        RISK BEHAVIORS  "AND HEALTH HABITS:  Answers for HPI/ROS submitted by the patient on 1/9/2023  Frequency of exercise:: 4-5 days/week  Getting at least 3 servings of Calcium per day:: Yes  Diet:: Regular (no restrictions)  Taking medications regularly:: Not Applicable  Medication side effects:: Not applicable  Bi-annual eye exam:: Yes  Dental care twice a year:: Yes  Sleep apnea or symptoms of sleep apnea:: None  abdominal pain: No  Blood in stool: No  Blood in urine: No  chest pain: No  chills: No  congestion: No  constipation: No  cough: No  diarrhea: No  dizziness: No  ear pain: No  eye pain: No  nervous/anxious: No  fever: No  frequency: No  genital sores: No  headaches: No  hearing loss: No  heartburn: No  arthralgias: No  joint swelling: No  peripheral edema: No  mood changes: No  myalgias: No  nausea: No  dysuria: No  palpitations: No  Skin sensation changes: No  sore throat: No  urgency: No  rash: No  shortness of breath: No  visual disturbance: No  weakness: No  impotence: Yes  penile discharge: No  Additional concerns today:: Yes  Duration of exercise:: 45-60 minutes        REVIEW OF SYSTEMS:  Complete head to toe review of systems is otherwise negative except as above.    OBJECTIVE:  VITAL SIGNS:  /82 (BP Location: Left arm, Patient Position: Sitting, Cuff Size: Adult Regular)   Pulse 63   Temp 97.7  F (36.5  C) (Oral)   Resp 16   Ht 1.797 m (5' 10.75\")   Wt 83.5 kg (184 lb)   SpO2 98%   BMI 25.84 kg/m    GENERAL:  Patient alert, in NAD  EYES: PERRLA. Extraoccular movements intact, pupils equal, reactive to light and accommodation.  Normal conjunctiva and lids.    ENT:  Hearing grossly normal.  Normal appearance to ears and nose.  Bilateral TM s, external canals, oropharynx normal. Normal lips, gums and teeth.    NECK:  Supple, without thyromegaly or mass.  RESP:  Clear to auscultation without crackles, wheezes or distress.  Normal respiratory effort.   CV:  Regular rate and rhythm without murmurs, rubs or " gallops.  Normal pedal pulses.  No varicosities or edema.  ABDOMEN:  Soft, non-tender, without hepatosplenomegaly, masses, or hernias.    :  Normal scrotum.  Prostate is smooth, mildly enlarged with no nodules.  LYMPHATIC: No cervical lymphadenopathy.  No bruising.  NEURO:  CN II-XII intact, motor & sensory function all intact.  DTR and reflexes normal.  PSYCHIATRIC:  Alert & oriented with normal mood and affect.  Good judgment and insight.  SKIN:  Normal inspection and palpation.  MUSCULOSKELETAL: Normal gait and station.  - Spine / Ribs / Pelvis: Normal inspection, ROM, stability and strength: Spine, Head, Neck, Upper and Lower Extremities.        Wt Readings from Last 3 Encounters:   01/16/23 83.5 kg (184 lb)   09/14/21 76.6 kg (168 lb 14.4 oz)   04/12/21 81.2 kg (179 lb 1 oz)         Yanique Monge MD

## 2023-01-26 ENCOUNTER — TRANSFERRED RECORDS (OUTPATIENT)
Dept: HEALTH INFORMATION MANAGEMENT | Facility: CLINIC | Age: 48
End: 2023-01-26
Payer: COMMERCIAL

## 2023-03-01 ENCOUNTER — TRANSFERRED RECORDS (OUTPATIENT)
Dept: HEALTH INFORMATION MANAGEMENT | Facility: CLINIC | Age: 48
End: 2023-03-01

## 2023-03-27 ENCOUNTER — MYC MEDICAL ADVICE (OUTPATIENT)
Dept: FAMILY MEDICINE | Facility: CLINIC | Age: 48
End: 2023-03-27
Payer: COMMERCIAL

## 2023-07-17 ENCOUNTER — MYC MEDICAL ADVICE (OUTPATIENT)
Dept: FAMILY MEDICINE | Facility: CLINIC | Age: 48
End: 2023-07-17
Payer: COMMERCIAL

## 2023-09-07 ENCOUNTER — OFFICE VISIT (OUTPATIENT)
Dept: FAMILY MEDICINE | Facility: CLINIC | Age: 48
End: 2023-09-07
Payer: COMMERCIAL

## 2023-09-07 VITALS
OXYGEN SATURATION: 99 % | HEIGHT: 71 IN | SYSTOLIC BLOOD PRESSURE: 134 MMHG | WEIGHT: 173.3 LBS | HEART RATE: 50 BPM | BODY MASS INDEX: 24.26 KG/M2 | RESPIRATION RATE: 10 BRPM | DIASTOLIC BLOOD PRESSURE: 78 MMHG

## 2023-09-07 DIAGNOSIS — Z00.00 HEALTH CARE MAINTENANCE: Primary | ICD-10-CM

## 2023-09-07 DIAGNOSIS — E78.00 HYPERCHOLESTEREMIA: ICD-10-CM

## 2023-09-07 DIAGNOSIS — Z12.5 SCREENING FOR PROSTATE CANCER: ICD-10-CM

## 2023-09-07 DIAGNOSIS — Z23 NEED FOR VACCINATION: ICD-10-CM

## 2023-09-07 DIAGNOSIS — Z13.228 ENCOUNTER FOR SCREENING FOR OTHER METABOLIC DISORDERS: ICD-10-CM

## 2023-09-07 PROCEDURE — 90682 RIV4 VACC RECOMBINANT DNA IM: CPT | Performed by: FAMILY MEDICINE

## 2023-09-07 PROCEDURE — 99396 PREV VISIT EST AGE 40-64: CPT | Mod: 25 | Performed by: FAMILY MEDICINE

## 2023-09-07 PROCEDURE — 90472 IMMUNIZATION ADMIN EACH ADD: CPT | Performed by: FAMILY MEDICINE

## 2023-09-07 PROCEDURE — 90471 IMMUNIZATION ADMIN: CPT | Performed by: FAMILY MEDICINE

## 2023-09-07 PROCEDURE — 90750 HZV VACC RECOMBINANT IM: CPT | Performed by: FAMILY MEDICINE

## 2023-09-07 ASSESSMENT — ENCOUNTER SYMPTOMS
ARTHRALGIAS: 0
NAUSEA: 0
PARESTHESIAS: 0
SHORTNESS OF BREATH: 0
COUGH: 0
DIARRHEA: 0
PALPITATIONS: 0
HEMATURIA: 0
HEARTBURN: 0
CHILLS: 0
JOINT SWELLING: 0
DIZZINESS: 0
HEADACHES: 0
ABDOMINAL PAIN: 0
WEAKNESS: 0
FEVER: 0
MYALGIAS: 0
FREQUENCY: 0
HEMATOCHEZIA: 0
DYSURIA: 0
NERVOUS/ANXIOUS: 0
EYE PAIN: 0
CONSTIPATION: 0
SORE THROAT: 0

## 2023-09-07 NOTE — PROGRESS NOTES
MALE ADULT PREVENTIVE EXAM      Problem List Items Addressed This Visit       Hypercholesteremia    Relevant Orders    Lipid panel reflex to direct LDL Fasting    Health care maintenance - Primary     Up to date with colonoscopy.  He wishes to continue prostate cancer screening.          Other Visit Diagnoses       Screening for prostate cancer        Relevant Orders    PSA, screen    Encounter for screening for other metabolic disorders        Relevant Orders    Comprehensive metabolic panel    Need for vaccination        Relevant Orders    ZOSTER RECOMBINANT ADJUVANTED (SHINGRIX) (Completed)    INFLUENZA VACCINE 18-64Y (FLUBLOK) (Completed)          I suspect abdominal lump is lipoma or benign cyst.    CHIEF COMPLAINT:  Male preventive exam.    SUBJECTIVE:  Andre Garay is a 48 year old male.  He has the following complaints:    He has lump right flank. No painful.  No change in size.    Had colonoscopy in 2023 with adenoma so due in 5 years.          PAST MEDICAL & SURGICAL HISTORY:   Patient [unfilled].  He  has a past surgical history that includes shoulder surgery; knee surgery (01/01/2002); other surgical history; orthopedic surgery (March 2006); and Soft tissue surgery (June 2000)..  He currently has no medications in their medication list..  No Known Allergies    Health care maintenance  Up to date with colonoscopy.  He wishes to continue prostate cancer screening.      HABITS & SOCIAL HISTORY: He is single.  Has teenage children.  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  Runs.  He  reports that he has never smoked. He has never been exposed to tobacco smoke. He has never used smokeless tobacco.  He  reports current alcohol use of about 6.0 - 7.0 standard drinks of alcohol per week.      FAMILY HISTORY:  His family history includes Alzheimer Disease in his mother; Hyperlipidemia in his brother and father; Hypertension in his father; Lipids in his father; Mental Illness in his mother; Other  "- See Comments in his father and sister; Snoring in his father.    PRIOR LABS:  Lab Results   Component Value Date    WBC 8.6 04/12/2021    HGB 14.8 04/12/2021    HCT 44.3 04/12/2021    MCV 86 04/12/2021     04/12/2021     10/17/2022    BUN 12.9 10/17/2022    CR 1.01 10/17/2022     Lab Results   Component Value Date    CHOL 224 (H) 10/17/2022    HDL 60 10/17/2022    TRIG 117 10/17/2022     No results found for: TSH  No components found for: GLUC        RISK BEHAVIORS AND HEALTH HABITS:  Answers submitted by the patient for this visit:  Annual Preventive Visit (Submitted on 9/7/2023)  Chief Complaint: Annual Exam:  Frequency of exercise:: 4-5 days/week  Getting at least 3 servings of Calcium per day:: Yes  Diet:: Regular (no restrictions)  Taking medications regularly:: Not Applicable  Bi-annual eye exam:: Yes  Dental care twice a year:: Yes  Sleep apnea or symptoms of sleep apnea:: None  abdominal pain: No  Blood in stool: No  Blood in urine: No  chest pain: No  chills: No  congestion: No  constipation: No  cough: No  diarrhea: No  dizziness: No  ear pain: No  eye pain: No  nervous/anxious: No  fever: No  frequency: No  genital sores: No  headaches: No  hearing loss: No  heartburn: No  arthralgias: No  joint swelling: No  peripheral edema: No  mood changes: No  myalgias: No  nausea: No  dysuria: No  palpitations: No  Skin sensation changes: No  sore throat: No  urgency: No  rash: No  shortness of breath: No  visual disturbance: No  weakness: No  impotence: Yes  penile discharge: No  Additional concerns today:: Yes  Exercise outside of work (Submitted on 9/7/2023)  Chief Complaint: Annual Exam:  Duration of exercise:: 30-45 minutes      REVIEW OF SYSTEMS:  Complete head to toe review of systems is otherwise negative except as above.    OBJECTIVE:  VITAL SIGNS:  /78   Pulse 50   Resp 10   Ht 1.794 m (5' 10.63\")   Wt 78.6 kg (173 lb 4.8 oz)   SpO2 99%   BMI 24.42 kg/m    GENERAL:  Patient " alert, in NAD  EYES: PERRLA. Extraoccular movements intact, pupils equal, reactive to light and accommodation.  Normal conjunctiva and lids.    ENT:  Hearing grossly normal.  Normal appearance to ears and nose.  Bilateral TM s, external canals, oropharynx normal. Normal lips, gums and teeth.    NECK:  Supple, without thyromegaly or mass.  RESP:  Clear to auscultation without crackles, wheezes or distress.  Normal respiratory effort.   CV:  Regular rate and rhythm without murmurs, rubs or gallops.  Normal pedal pulses.  No varicosities or edema.  ABDOMEN:  Soft, non-tender, without hepatosplenomegaly, masses, or hernias.    :  Prostate is smooth, not enlarged with no nodules.  LYMPHATIC: No cervical lymphadenopathy.  No bruising.  NEURO:  CN II-XII intact, motor & sensory function all intact.  DTR and reflexes normal.  PSYCHIATRIC:  Alert & oriented with normal mood and affect.  Good judgment and insight.  SKIN:  2 cm soft lump right lateral abdomen that is non tender  MUSCULOSKELETAL: Normal gait and station.  - Spine / Ribs / Pelvis: Normal inspection, ROM, stability and strength: Spine, Head, Neck, Upper and Lower Extremities.        Yanique Monge MD

## 2023-09-11 ENCOUNTER — LAB (OUTPATIENT)
Dept: LAB | Facility: CLINIC | Age: 48
End: 2023-09-11
Payer: COMMERCIAL

## 2023-09-11 DIAGNOSIS — E78.00 HYPERCHOLESTEREMIA: ICD-10-CM

## 2023-09-11 DIAGNOSIS — Z13.228 ENCOUNTER FOR SCREENING FOR OTHER METABOLIC DISORDERS: ICD-10-CM

## 2023-09-11 DIAGNOSIS — Z12.5 SCREENING FOR PROSTATE CANCER: ICD-10-CM

## 2023-09-11 LAB
ALBUMIN SERPL BCG-MCNC: 4.3 G/DL (ref 3.5–5.2)
ALP SERPL-CCNC: 55 U/L (ref 40–129)
ALT SERPL W P-5'-P-CCNC: 14 U/L (ref 0–70)
ANION GAP SERPL CALCULATED.3IONS-SCNC: 13 MMOL/L (ref 7–15)
AST SERPL W P-5'-P-CCNC: 23 U/L (ref 0–45)
BILIRUB SERPL-MCNC: 0.3 MG/DL
BUN SERPL-MCNC: 15.1 MG/DL (ref 6–20)
CALCIUM SERPL-MCNC: 9.4 MG/DL (ref 8.6–10)
CHLORIDE SERPL-SCNC: 104 MMOL/L (ref 98–107)
CHOLEST SERPL-MCNC: 200 MG/DL
CREAT SERPL-MCNC: 0.94 MG/DL (ref 0.67–1.17)
DEPRECATED HCO3 PLAS-SCNC: 23 MMOL/L (ref 22–29)
EGFRCR SERPLBLD CKD-EPI 2021: >90 ML/MIN/1.73M2
GLUCOSE SERPL-MCNC: 96 MG/DL (ref 70–99)
HDLC SERPL-MCNC: 50 MG/DL
LDLC SERPL CALC-MCNC: 135 MG/DL
NONHDLC SERPL-MCNC: 150 MG/DL
POTASSIUM SERPL-SCNC: 4.3 MMOL/L (ref 3.4–5.3)
PROT SERPL-MCNC: 7.1 G/DL (ref 6.4–8.3)
PSA SERPL DL<=0.01 NG/ML-MCNC: 0.16 NG/ML (ref 0–3.5)
SODIUM SERPL-SCNC: 140 MMOL/L (ref 136–145)
TRIGL SERPL-MCNC: 76 MG/DL

## 2023-09-11 PROCEDURE — 80053 COMPREHEN METABOLIC PANEL: CPT

## 2023-09-11 PROCEDURE — G0103 PSA SCREENING: HCPCS

## 2023-09-11 PROCEDURE — 36415 COLL VENOUS BLD VENIPUNCTURE: CPT

## 2023-09-11 PROCEDURE — 80061 LIPID PANEL: CPT

## 2024-01-30 ENCOUNTER — OFFICE VISIT (OUTPATIENT)
Dept: FAMILY MEDICINE | Facility: CLINIC | Age: 49
End: 2024-01-30
Payer: COMMERCIAL

## 2024-01-30 VITALS
WEIGHT: 174 LBS | RESPIRATION RATE: 15 BRPM | DIASTOLIC BLOOD PRESSURE: 82 MMHG | BODY MASS INDEX: 24.36 KG/M2 | TEMPERATURE: 97.8 F | HEIGHT: 71 IN | SYSTOLIC BLOOD PRESSURE: 134 MMHG | OXYGEN SATURATION: 100 % | HEART RATE: 54 BPM

## 2024-01-30 DIAGNOSIS — R19.09 ABDOMINAL MASS OF OTHER SITE: Primary | ICD-10-CM

## 2024-01-30 PROCEDURE — 99213 OFFICE O/P EST LOW 20 MIN: CPT | Performed by: FAMILY MEDICINE

## 2024-01-30 ASSESSMENT — PAIN SCALES - GENERAL: PAINLEVEL: MODERATE PAIN (4)

## 2024-01-30 NOTE — PROGRESS NOTES
Assessment & Plan     Abdominal mass of other site  History and exam are most consistent with a torn muscle of the abdominal wall although the mechanism of injury is most unusual.  I do not appreciate a defect in the abdominal wall musculature to suggest a hernia although that is certainly a possibility.  This swelling is unlikely due to an intra-abdominal mass.  I recommend we start w/u with imaging via US but discussed that we may need to follow that with a CT or MRI.  He wonders about exercise restrictions given that it seemed to worsen after he went for his usual run and I advised him to hold off on vigorous exercise for now.  - US Abdomen Limited        Subjective   Andre is a 48 year old, presenting for the following health issues:  Musculoskeletal Problem (Right side pain - muscle pulled after long run right side has gotten worse )        1/30/2024    10:06 AM   Additional Questions   Roomed by Meghan Toney     History of Present Illness       Reason for visit:  Limp.and pull muscle  Symptom onset:  3-7 days ago  Symptom intensity:  Moderate  Symptom progression:  Worsening  Had these symptoms before:  No    He eats 4 or more servings of fruits and vegetables daily.He consumes 0 sweetened beverage(s) daily.He exercises with enough effort to increase his heart rate 60 or more minutes per day.  He exercises with enough effort to increase his heart rate 4 days per week.   He is taking medications regularly.     Sudden onset 8 days ago.  While he was applying lotion to his right leg, with his right foot propped on the edge of the bathtub, he felt a sudden pulling sensation in his right abdomen.  Mild discomfort radiating laterally and he noted a bulge in the right abdomen.  The discomfort is worse with movement of his torso such as twisting side-to-side  Went on a run and afterwards noted that the lump/bulge in right abdomen seemed larger/more prominent  He is a long-distance runner.        Objective    BP  "134/82 (BP Location: Right arm, Patient Position: Sitting, Cuff Size: Adult Regular)   Pulse 54   Temp 97.8  F (36.6  C) (Temporal)   Resp 15   Ht 1.803 m (5' 11\")   Wt 78.9 kg (174 lb)   SpO2 100%   BMI 24.27 kg/m    Body mass index is 24.27 kg/m .  Physical Exam   GENERAL: healthy, alert and no distress  EYES: Eyes grossly normal to inspection, conjunctivae and sclerae normal  RESP: lungs clear to auscultation - no rales, rhonchi or wheezes  CV: regular rate and rhythm, normal S1 S2, no S3 or S4, no murmur, click or rub, no peripheral edema  ABDOMEN: soft, nontender, no hepatosplenomegaly, while standing there is a prominent bulge of the right abdomen with marked asymmetry compared to the contour of his left abdominal wall, this is soft and nontender with no palpable defect in the abdominal wall to suggest a hernia, no inguinal hernia or inguinal tenderness to palpation  NEURO: Grossly normal strength and tone, mentation intact and speech normal        Signed Electronically by: Carli Burns MD    " Attending Attestation (For Attendings USE Only)...

## 2024-02-01 ENCOUNTER — ANCILLARY PROCEDURE (OUTPATIENT)
Dept: ULTRASOUND IMAGING | Facility: CLINIC | Age: 49
End: 2024-02-01
Attending: FAMILY MEDICINE
Payer: COMMERCIAL

## 2024-02-01 DIAGNOSIS — R19.09 ABDOMINAL MASS OF OTHER SITE: ICD-10-CM

## 2024-02-01 PROCEDURE — 76705 ECHO EXAM OF ABDOMEN: CPT | Mod: GC | Performed by: RADIOLOGY

## 2024-02-02 NOTE — RESULT ENCOUNTER NOTE
Discussed results with patient by phone. He feels it is improving and less painful. He'll return to running and notify me if symptoms persist or worsen.  If so, we'll then consider CT.  Carli Burns MD  [facility]

## 2024-07-02 ENCOUNTER — OFFICE VISIT (OUTPATIENT)
Dept: FAMILY MEDICINE | Facility: CLINIC | Age: 49
End: 2024-07-02
Payer: COMMERCIAL

## 2024-07-02 VITALS
TEMPERATURE: 98.2 F | RESPIRATION RATE: 20 BRPM | BODY MASS INDEX: 24.04 KG/M2 | HEART RATE: 78 BPM | DIASTOLIC BLOOD PRESSURE: 80 MMHG | OXYGEN SATURATION: 99 % | WEIGHT: 171.7 LBS | HEIGHT: 71 IN | SYSTOLIC BLOOD PRESSURE: 110 MMHG

## 2024-07-02 DIAGNOSIS — Z13.1 SCREENING FOR DIABETES MELLITUS: ICD-10-CM

## 2024-07-02 DIAGNOSIS — R74.8 LIVER ENZYME ELEVATION: ICD-10-CM

## 2024-07-02 DIAGNOSIS — Z13.220 LIPID SCREENING: ICD-10-CM

## 2024-07-02 DIAGNOSIS — Z23 NEED FOR VACCINATION: ICD-10-CM

## 2024-07-02 DIAGNOSIS — Z00.00 ROUTINE GENERAL MEDICAL EXAMINATION AT A HEALTH CARE FACILITY: Primary | ICD-10-CM

## 2024-07-02 PROCEDURE — 90472 IMMUNIZATION ADMIN EACH ADD: CPT | Performed by: FAMILY MEDICINE

## 2024-07-02 PROCEDURE — 90471 IMMUNIZATION ADMIN: CPT | Performed by: FAMILY MEDICINE

## 2024-07-02 PROCEDURE — 99396 PREV VISIT EST AGE 40-64: CPT | Mod: 25 | Performed by: FAMILY MEDICINE

## 2024-07-02 PROCEDURE — 90715 TDAP VACCINE 7 YRS/> IM: CPT | Performed by: FAMILY MEDICINE

## 2024-07-02 PROCEDURE — 90750 HZV VACC RECOMBINANT IM: CPT | Performed by: FAMILY MEDICINE

## 2024-07-02 SDOH — HEALTH STABILITY: PHYSICAL HEALTH: ON AVERAGE, HOW MANY DAYS PER WEEK DO YOU ENGAGE IN MODERATE TO STRENUOUS EXERCISE (LIKE A BRISK WALK)?: 4 DAYS

## 2024-07-02 SDOH — HEALTH STABILITY: PHYSICAL HEALTH: ON AVERAGE, HOW MANY MINUTES DO YOU ENGAGE IN EXERCISE AT THIS LEVEL?: 150+ MIN

## 2024-07-02 ASSESSMENT — PAIN SCALES - GENERAL: PAINLEVEL: NO PAIN (0)

## 2024-07-02 ASSESSMENT — SOCIAL DETERMINANTS OF HEALTH (SDOH): HOW OFTEN DO YOU GET TOGETHER WITH FRIENDS OR RELATIVES?: ONCE A WEEK

## 2024-07-02 NOTE — NURSING NOTE
Prior to immunization administration, verified patients identity using patient s name and date of birth. Please see Immunization Activity for additional information.     Screening Questionnaire for Adult Immunization    Are you sick today?   No   Do you have allergies to medications, food, a vaccine component or latex?   No   Have you ever had a serious reaction after receiving a vaccination?   No   Do you have a long-term health problem with heart, lung, kidney, or metabolic disease (e.g., diabetes), asthma, a blood disorder, no spleen, complement component deficiency, a cochlear implant, or a spinal fluid leak?  Are you on long-term aspirin therapy?   No   Do you have cancer, leukemia, HIV/AIDS, or any other immune system problem?   No   Do you have a parent, brother, or sister with an immune system problem?   No   In the past 3 months, have you taken medications that affect  your immune system, such as prednisone, other steroids, or anticancer drugs; drugs for the treatment of rheumatoid arthritis, Crohn s disease, or psoriasis; or have you had radiation treatments?   No   Have you had a seizure, or a brain or other nervous system problem?   No   During the past year, have you received a transfusion of blood or blood    products, or been given immune (gamma) globulin or antiviral drug?   No   For women: Are you pregnant or is there a chance you could become       pregnant during the next month?   No   Have you received any vaccinations in the past 4 weeks?   No     Immunization questionnaire answers were all negative.      Patient instructed to remain in clinic for 15 minutes afterwards, and to report any adverse reactions.     Screening performed by Na Mcelroy MA on 7/2/2024 at 12:18 PM.

## 2024-07-02 NOTE — PROGRESS NOTES
"Preventive Care Visit  [facility]  Carli Burns MD, Family Medicine  Jul 2, 2024      Assessment & Plan     Routine general medical examination at a health care facility  Reviewed/updated Health Maintenance.  He'd like more \"preventive\" testing including muscle strength testing and VOmax testing.  He references Dr. Wolff's book \"Outlive.\"  Discussed that those are not routine preventive tests and that excessive testing can lead to harms of overdiagnosis and treatment for incidentally noted abnormalities.  I referred him to preventive cardiology.  - Adult Cardiology Eval  Referral    Liver enzyme elevation  More recently have been normal.    - Comprehensive metabolic panel (BMP + Alb, Alk Phos, ALT, AST, Total. Bili, TP)    Lipid screening  - Lipid panel reflex to direct LDL Fasting    Screening for diabetes mellitus  - Hemoglobin A1c  - Comprehensive metabolic panel (BMP + Alb, Alk Phos, ALT, AST, Total. Bili, TP)    Need for vaccination  Adacel and Shingrix #2.  - TDAP 10-64Y (ADACEL,BOOSTRIX)  - ZOSTER RECOMBINANT ADJUVANTED (SHINGRIX)      Counseling  Appropriate preventive services were discussed with this patient, including applicable screening as appropriate for fall prevention, nutrition, physical activity, Tobacco-use cessation, weight loss and cognition.  Checklist reviewing preventive services available has been given to the patient.  Reviewed patient's diet, addressing concerns and/or questions.   He is at risk for psychosocial distress and has been provided with information to reduce risk.     See Patient Instructions      Krystina Powell is a 48 year old, presenting for the following:  Physical        7/2/2024    11:13 AM   Additional Questions   Roomed by Na   Accompanied by alone         7/2/2024    11:13 AM   Patient Reported Additional Medications   Patient reports taking the following new medications none        Health Care Directive  Patient does " not have a Health Care Directive or Living Will: Discussed advance care planning with patient; information given to patient to review.    HPI          7/2/2024   General Health   How would you rate your overall physical health? Good   Feel stress (tense, anxious, or unable to sleep) Only a little      (!) STRESS CONCERN      7/2/2024   Nutrition   Three or more servings of calcium each day? Yes   Diet: Regular (no restrictions)   How many servings of fruit and vegetables per day? 4 or more   How many sweetened beverages each day? 0-1            7/2/2024   Exercise   Days per week of moderate/strenous exercise 4 days   Average minutes spent exercising at this level 150+ min            7/2/2024   Social Factors   Frequency of gathering with friends or relatives Once a week   Worry food won't last until get money to buy more No   Food not last or not have enough money for food? No   Do you have housing? (Housing is defined as stable permanent housing and does not include staying ouside in a car, in a tent, in an abandoned building, in an overnight shelter, or couch-surfing.) Yes   Are you worried about losing your housing? No   Lack of transportation? No   Unable to get utilities (heat,electricity)? No            7/2/2024   Fall Risk   Fallen 2 or more times in the past year? No   Trouble with walking or balance? No             7/2/2024   Dental   Dentist two times every year? Yes            7/2/2024   TB Screening   Were you born outside of the [place]? Yes            Today's PHQ-2 Score:       1/30/2024     8:45 AM   PHQ-2 ( 1999 [employer])   Q1: Little interest or pleasure in doing things 0   Q2: Feeling down, depressed or hopeless 0   PHQ-2 Score 0   Q1: Little interest or pleasure in doing things Not at all   Q2: Feeling down, depressed or hopeless Not at all   PHQ-2 Score 0         7/2/2024   Substance Use   Alcohol more than 3/day or more than 7/wk No   Do you use any other substances recreationally? (!) ALCOHOL         Social History     Tobacco Use    Smoking status: Never     Passive exposure: Never    Smokeless tobacco: Never   Vaping Use    Vaping status: Never Used   Substance Use Topics    Alcohol use: Yes     Alcohol/week: 6.0 - 7.0 standard drinks of alcohol    Drug use: No           7/2/2024   STI Screening   New sexual partner(s) since last STI/HIV test? No      ASCVD Risk   The 10-year ASCVD risk score (Ricardo HAMPTON, et al., 2019) is: 2.7%    Values used to calculate the score:      Age: 48 years      Sex: Male      Is Non- : No      Diabetic: No      Tobacco smoker: No      Systolic Blood Pressure: 110 mmHg      Is BP treated: No      HDL Cholesterol: 50 mg/dL      Total Cholesterol: 200 mg/dL          7/2/2024   Contraception/Family Planning   Questions about contraception or family planning No         Reviewed and updated as needed this visit by Provider   Tobacco  Allergies  Meds  Problems  Med Hx  Surg Hx  Fam Hx            BP Readings from Last 3 Encounters:   07/02/24 110/80   01/30/24 134/82   09/07/23 134/78    Wt Readings from Last 3 Encounters:   07/02/24 77.9 kg (171 lb 11.2 oz)   01/30/24 78.9 kg (174 lb)   09/07/23 78.6 kg (173 lb 4.8 oz)                  Patient Active Problem List   Diagnosis    Hypercholesteremia    Health care maintenance     Past Surgical History:   Procedure Laterality Date    KNEE SURGERY  01/01/2002    ORTHOPEDIC SURGERY  March 2006    Left shoulder discolated    OTHER SURGICAL HISTORY      Crowns    SHOULDER SURGERY      SOFT TISSUE SURGERY  June 2000    Meniscus Right knee       Social History     Tobacco Use    Smoking status: Never     Passive exposure: Never    Smokeless tobacco: Never   Substance Use Topics    Alcohol use: Yes     Alcohol/week: 6.0 - 7.0 standard drinks of alcohol     Family History   Problem Relation Age of Onset    Alzheimer Disease Mother     Mental Illness Mother         Alzheimer    Lipids Father     Hypertension  "Father     Hyperlipidemia Father     Other - See Comments Father         enlarged prostate, NO cancer    Snoring Father     Hyperlipidemia Brother     Other - See Comments Sister         Knee problems    Heart Disease No family hx of               Objective    Exam  /80 (BP Location: Right arm, Patient Position: Sitting, Cuff Size: Adult Regular)   Pulse 78   Temp 98.2  F (36.8  C) (Temporal)   Resp 20   Ht 1.795 m (5' 10.67\")   Wt 77.9 kg (171 lb 11.2 oz)   SpO2 99%   BMI 24.17 kg/m     Estimated body mass index is 24.17 kg/m  as calculated from the following:    Height as of this encounter: 1.795 m (5' 10.67\").    Weight as of this encounter: 77.9 kg (171 lb 11.2 oz).    Physical Exam  GENERAL: healthy, alert and no distress  EYES: Eyes grossly normal to inspection, conjunctivae and sclerae normal  HENT: ear canals and TM's normal  NECK: no adenopathy, no asymmetry, masses, or scars and thyroid normal to palpation  RESP: lungs clear to auscultation - no rales, rhonchi or wheezes  CV: regular rate and rhythm, normal S1 S2, no S3 or S4, no murmur, click or rub  ABDOMEN: soft, nontender, no hepatosplenomegaly, no masses  MS: no gross musculoskeletal defects noted, no edema  SKIN: no suspicious lesions or rashes  NEURO: Grossly normal strength and tone, mentation intact and speech normal  PSYCH: mentation appears normal, affect normal/bright      Signed Electronically by: Carli Burns MD    "

## 2024-07-02 NOTE — PATIENT INSTRUCTIONS
"  If you have MyChart:  1) I kindly request that you check your MyChart prior to all appointments with me and complete any assigned questionnaires ahead of time.    2) You may receive auto-released results from our system before I have the opportunity to review and comment.  Be assured I will review and comment on all of your results as soon as I can.      FYI:  My schedule has been booking out very far in advance (2 months).  I apologize for the lack of timely access.  If you need to be seen for a chronic condition or preventive (wellness) visit, please be sure to schedule that appointment 2-3 months in advance.  If you have a concern that you feel cannot wait until my next available appointment (such as a hospital follow-up, pre-op, or new symptom of concern) please call clinic at [phone] and say \"my care team.\"  If you are still told that I have no availability please ask to speak to a [place]  or Nurse who can often offer you an appointment with me within a week or so.  Or you can send me a Lysanda message.  The trick is to avoid speaking with central scheduling.    I do ask that all patients who are taking chronic medications for conditions that I am managing schedule an in-person visit with me at least once a year.    Patient Education   Preventive Care Advice   This is general advice we often give to help people stay healthy. Your care team may have specific advice just for you. Please talk to your care team about your own preventive care needs.  Lifestyle  Exercise at least 150 minutes each week (30 minutes a day, 5 days a week).  Do muscle strengthening activities 2 days a week. These help control your weight and prevent disease.  No smoking.  Wear sunscreen to prevent skin cancer.  Have your home tested for radon every 2 to 5 years. Radon is a colorless, odorless gas that can harm your lungs. To learn more, go to www.health.state.[place] and search for \"Radon in Homes.\"  Keep guns " "unloaded and locked up in a safe place like a safe or gun vault, or, use a gun lock and hide the keys. Always lock away bullets separately. To learn more, visit dps.[place].gov and search for \"safe gun storage.\"  Nutrition  Eat 5 or more servings of fruits and vegetables each day.  Try wheat bread, brown rice and whole grain pasta (instead of white bread, rice, and pasta).  Get enough calcium and vitamin D. Check the label on foods and aim for 100% of the RDA (recommended daily allowance).  Regular exams  Have a dental exam and cleaning every 6 months.  See your health care team every year to talk about:  Any changes in your health.  Any medicines your care team has prescribed.  Preventive care, family planning, and ways to prevent chronic diseases.  Shots (vaccines)   HPV shots (up to age 26), if you've never had them before.  Hepatitis B shots (up to age 59), if you've never had them before.  COVID-19 shot: Get this shot when it's due.  Flu shot: Get a flu shot every year.  Tetanus shot: Get a tetanus shot every 10 years.  Pneumococcal, hepatitis A, and RSV shots: Ask your care team if you need these based on your risk.  Shingles shot (for age 50 and up).  General health tests  Diabetes screening:  Starting at age 35, Get screened for diabetes at least every 3 years.  If you are younger than age 35, ask your care team if you should be screened for diabetes.  Cholesterol test: At age 39, start having a cholesterol test every 5 years, or more often if advised.  Bone density scan (DEXA): At age 50, ask your care team if you should have this scan for osteoporosis (brittle bones).  Hepatitis C: Get tested at least once in your life.  Abdominal aortic aneurysm screening: Talk to your doctor about having this screening if you:  Have ever smoked; and  Are biologically male; and  Are between the ages of 65 and 75.  STIs (sexually transmitted infections)  Before age 24: Ask your care team if you should be screened for " STIs.  After age 24: Get screened for STIs if you're at risk. You are at risk for STIs (including HIV) if:  You are sexually active with more than one person.  You don't use condoms every time.  You or a partner was diagnosed with a sexually transmitted infection.  If you are at risk for HIV, ask about PrEP medicine to prevent HIV.  Get tested for HIV at least once in your life, whether you are at risk for HIV or not.  Cancer screening tests  Cervical cancer screening: If you have a cervix, begin getting regular cervical cancer screening tests at age 21. Most people who have regular screenings with normal results can stop after age 65. Talk about this with your provider.  Breast cancer scan (mammogram): If you've ever had breasts, begin having regular mammograms starting at age 40. This is a scan to check for breast cancer.  Colon cancer screening: It is important to start screening for colon cancer at age 45.  Have a colonoscopy test every 10 years (or more often if you're at risk) Or, ask your provider about stool tests like a FIT test every year or Cologuard test every 3 years.  To learn more about your testing options, visit: www.Walk-in Appointment Scheduler/327266.pdf.  For help making a decision, visit: tee.radha/is85825.  Prostate cancer screening test: If you have a prostate and are age 55 to 69, ask your provider if you would benefit from a yearly prostate cancer screening test.  Lung cancer screening: If you are a current or former smoker age 50 to 80, ask your care team if ongoing lung cancer screenings are right for you.  For informational purposes only. Not to replace the advice of your health care provider. Copyright   2023 [employer]. All rights reserved. Clinically reviewed by the [facility] Transitions Program. [employer] 105951 - REV 04/24.

## 2024-07-05 ENCOUNTER — LAB (OUTPATIENT)
Dept: LAB | Facility: CLINIC | Age: 49
End: 2024-07-05
Payer: COMMERCIAL

## 2024-07-05 DIAGNOSIS — R74.8 LIVER ENZYME ELEVATION: ICD-10-CM

## 2024-07-05 DIAGNOSIS — Z13.220 LIPID SCREENING: ICD-10-CM

## 2024-07-05 DIAGNOSIS — Z13.1 SCREENING FOR DIABETES MELLITUS: ICD-10-CM

## 2024-07-05 LAB
ALBUMIN SERPL BCG-MCNC: 4.3 G/DL (ref 3.5–5.2)
ALP SERPL-CCNC: 57 U/L (ref 40–150)
ALT SERPL W P-5'-P-CCNC: 13 U/L (ref 0–70)
ANION GAP SERPL CALCULATED.3IONS-SCNC: 9 MMOL/L (ref 7–15)
AST SERPL W P-5'-P-CCNC: 22 U/L (ref 0–45)
BILIRUB SERPL-MCNC: 0.2 MG/DL
BUN SERPL-MCNC: 15.6 MG/DL (ref 6–20)
CALCIUM SERPL-MCNC: 9.1 MG/DL (ref 8.6–10)
CHLORIDE SERPL-SCNC: 105 MMOL/L (ref 98–107)
CHOLEST SERPL-MCNC: 196 MG/DL
CREAT SERPL-MCNC: 0.96 MG/DL (ref 0.67–1.17)
DEPRECATED HCO3 PLAS-SCNC: 26 MMOL/L (ref 22–29)
EGFRCR SERPLBLD CKD-EPI 2021: >90 ML/MIN/1.73M2
FASTING STATUS PATIENT QL REPORTED: ABNORMAL
FASTING STATUS PATIENT QL REPORTED: NORMAL
GLUCOSE SERPL-MCNC: 88 MG/DL (ref 70–99)
HBA1C MFR BLD: 5.4 % (ref 0–5.6)
HDLC SERPL-MCNC: 51 MG/DL
LDLC SERPL CALC-MCNC: 126 MG/DL
NONHDLC SERPL-MCNC: 145 MG/DL
POTASSIUM SERPL-SCNC: 4.3 MMOL/L (ref 3.4–5.3)
PROT SERPL-MCNC: 7.5 G/DL (ref 6.4–8.3)
SODIUM SERPL-SCNC: 140 MMOL/L (ref 135–145)
TRIGL SERPL-MCNC: 95 MG/DL

## 2024-07-05 PROCEDURE — 80061 LIPID PANEL: CPT

## 2024-07-05 PROCEDURE — 36415 COLL VENOUS BLD VENIPUNCTURE: CPT

## 2024-07-05 PROCEDURE — 83036 HEMOGLOBIN GLYCOSYLATED A1C: CPT

## 2024-07-05 PROCEDURE — 80053 COMPREHEN METABOLIC PANEL: CPT

## 2024-07-07 NOTE — RESULT ENCOUNTER NOTE
Cornel Powell,  Your Hemoglobin A1C (a test assessing overall blood sugar control for the last 3 months) and your comprehensive metabolic panel results (liver tests, kidney function, blood sugar, and blood salts) are all normal.      Your lipid panel (cholesterol) results are stable with slight improvement of your total cholesterol, LDL (bad) cholesterol, and non-HDL cholesterol when compared to last year.  However, there is significant improvement in your lipid results when compared to 2020.    Carli Burns MD

## 2025-06-02 ENCOUNTER — PATIENT OUTREACH (OUTPATIENT)
Dept: CARE COORDINATION | Facility: CLINIC | Age: 50
End: 2025-06-02
Payer: COMMERCIAL

## 2025-08-03 ENCOUNTER — HEALTH MAINTENANCE LETTER (OUTPATIENT)
Age: 50
End: 2025-08-03